# Patient Record
Sex: MALE | Race: WHITE | NOT HISPANIC OR LATINO | Employment: OTHER | URBAN - METROPOLITAN AREA
[De-identification: names, ages, dates, MRNs, and addresses within clinical notes are randomized per-mention and may not be internally consistent; named-entity substitution may affect disease eponyms.]

---

## 2023-04-27 LAB
CHOLESTEROL (EXTERNAL): 115 MG/DL
HDLC SERPL-MCNC: 35 MG/DL
LDL CHOLESTEROL CALCULATED (EXTERNAL): 49 MG/DL
NON HDL CHOLESTEROL (EXTERNAL): 80 MG/DL
TRIGLYCERIDES (EXTERNAL): 185 MG/DL
TSH SERPL-ACNC: 1.7 MIU/L (ref 0.3–4.2)

## 2023-06-05 ENCOUNTER — TRANSFERRED RECORDS (OUTPATIENT)
Dept: MULTI SPECIALTY CLINIC | Facility: CLINIC | Age: 68
End: 2023-06-05

## 2023-06-05 LAB
ALT SERPL-CCNC: 26 U/L (ref 7–55)
AST SERPL-CCNC: 20 U/L (ref 8–48)
CREATININE (EXTERNAL): 0.96 MG/DL (ref 0.74–1.35)
GFR ESTIMATED (EXTERNAL): 86 ML/MIN/BSA
GLUCOSE (EXTERNAL): 105 MG/DL (ref 70–140)
POTASSIUM (EXTERNAL): 4.4 MMOL/L (ref 3.6–5.2)

## 2023-06-12 RX ORDER — ATORVASTATIN CALCIUM 20 MG/1
20 TABLET, FILM COATED ORAL EVERY MORNING
COMMUNITY

## 2023-06-12 RX ORDER — LISINOPRIL 20 MG/1
20 TABLET ORAL DAILY
COMMUNITY

## 2023-06-12 RX ORDER — GABAPENTIN 300 MG/1
600 CAPSULE ORAL 2 TIMES DAILY
COMMUNITY

## 2023-06-14 ENCOUNTER — ANESTHESIA EVENT (OUTPATIENT)
Dept: SURGERY | Facility: CLINIC | Age: 68
DRG: 519 | End: 2023-06-14
Payer: MEDICARE

## 2023-06-15 ENCOUNTER — HOSPITAL ENCOUNTER (INPATIENT)
Facility: CLINIC | Age: 68
LOS: 5 days | Discharge: NURSING, REHAB OR LONG-TERM CARE | DRG: 519 | End: 2023-06-20
Attending: ORTHOPAEDIC SURGERY | Admitting: ORTHOPAEDIC SURGERY
Payer: MEDICARE

## 2023-06-15 ENCOUNTER — APPOINTMENT (OUTPATIENT)
Dept: PHYSICAL THERAPY | Facility: CLINIC | Age: 68
DRG: 519 | End: 2023-06-15
Attending: PHYSICIAN ASSISTANT
Payer: MEDICARE

## 2023-06-15 ENCOUNTER — ANESTHESIA (OUTPATIENT)
Dept: SURGERY | Facility: CLINIC | Age: 68
DRG: 519 | End: 2023-06-15
Payer: MEDICARE

## 2023-06-15 ENCOUNTER — APPOINTMENT (OUTPATIENT)
Dept: RADIOLOGY | Facility: CLINIC | Age: 68
DRG: 519 | End: 2023-06-15
Attending: ORTHOPAEDIC SURGERY
Payer: MEDICARE

## 2023-06-15 DIAGNOSIS — Z98.890 STATUS POST LUMBAR SPINE OPERATIVE PROCEDURE FOR DECOMPRESSION OF SPINAL CORD: ICD-10-CM

## 2023-06-15 DIAGNOSIS — M54.50 LUMBAR BACK PAIN: Primary | ICD-10-CM

## 2023-06-15 PROBLEM — E78.5 HYPERLIPEMIA: Status: ACTIVE | Noted: 2023-06-15

## 2023-06-15 PROBLEM — E78.5 HYPERLIPEMIA: Chronic | Status: ACTIVE | Noted: 2023-06-15

## 2023-06-15 PROBLEM — M43.10 SPONDYLOLISTHESIS: Status: ACTIVE | Noted: 2019-10-30

## 2023-06-15 PROBLEM — M43.16 SPONDYLOLISTHESIS OF LUMBAR REGION: Chronic | Status: ACTIVE | Noted: 2019-10-30

## 2023-06-15 PROBLEM — Z85.71 HISTORY OF HODGKIN'S LYMPHOMA: Status: ACTIVE | Noted: 2017-12-04

## 2023-06-15 PROBLEM — G47.30 SLEEP APNEA: Chronic | Status: ACTIVE | Noted: 2023-06-15

## 2023-06-15 PROBLEM — M43.10 SPONDYLOLISTHESIS: Chronic | Status: ACTIVE | Noted: 2019-10-30

## 2023-06-15 PROBLEM — G47.30 SLEEP APNEA: Status: ACTIVE | Noted: 2023-06-15

## 2023-06-15 PROBLEM — M43.16 SPONDYLOLISTHESIS OF LUMBAR REGION: Status: ACTIVE | Noted: 2019-10-30

## 2023-06-15 PROBLEM — E66.01 MORBID OBESITY (H): Status: ACTIVE | Noted: 2023-06-15

## 2023-06-15 PROBLEM — U07.1 DISEASE DUE TO SEVERE ACUTE RESPIRATORY SYNDROME CORONAVIRUS 2 (SARS-COV-2): Status: ACTIVE | Noted: 2023-02-12

## 2023-06-15 LAB — GLUCOSE BLDC GLUCOMTR-MCNC: 107 MG/DL (ref 70–99)

## 2023-06-15 PROCEDURE — 258N000003 HC RX IP 258 OP 636

## 2023-06-15 PROCEDURE — 250N000009 HC RX 250: Performed by: PHYSICIAN ASSISTANT

## 2023-06-15 PROCEDURE — 258N000003 HC RX IP 258 OP 636: Performed by: NURSE ANESTHETIST, CERTIFIED REGISTERED

## 2023-06-15 PROCEDURE — 999N000141 HC STATISTIC PRE-PROCEDURE NURSING ASSESSMENT: Performed by: ORTHOPAEDIC SURGERY

## 2023-06-15 PROCEDURE — 120N000001 HC R&B MED SURG/OB

## 2023-06-15 PROCEDURE — 710N000010 HC RECOVERY PHASE 1, LEVEL 2, PER MIN: Performed by: ORTHOPAEDIC SURGERY

## 2023-06-15 PROCEDURE — 250N000011 HC RX IP 250 OP 636: Performed by: PHYSICIAN ASSISTANT

## 2023-06-15 PROCEDURE — 258N000003 HC RX IP 258 OP 636: Performed by: ORTHOPAEDIC SURGERY

## 2023-06-15 PROCEDURE — 82962 GLUCOSE BLOOD TEST: CPT

## 2023-06-15 PROCEDURE — 250N000005 HC OR RX SURGIFLO HEMOSTATIC MATRIX 10ML 199102S OPNP: Performed by: ORTHOPAEDIC SURGERY

## 2023-06-15 PROCEDURE — 250N000009 HC RX 250: Performed by: ORTHOPAEDIC SURGERY

## 2023-06-15 PROCEDURE — 272N000001 HC OR GENERAL SUPPLY STERILE: Performed by: ORTHOPAEDIC SURGERY

## 2023-06-15 PROCEDURE — 999N000182 XR SURGERY CARM FLUORO GREATER THAN 5 MIN: Mod: TC

## 2023-06-15 PROCEDURE — 250N000009 HC RX 250: Performed by: NURSE ANESTHETIST, CERTIFIED REGISTERED

## 2023-06-15 PROCEDURE — 01NB0ZZ RELEASE LUMBAR NERVE, OPEN APPROACH: ICD-10-PCS | Performed by: ORTHOPAEDIC SURGERY

## 2023-06-15 PROCEDURE — 258N000003 HC RX IP 258 OP 636: Performed by: PHYSICIAN ASSISTANT

## 2023-06-15 PROCEDURE — 99232 SBSQ HOSP IP/OBS MODERATE 35: CPT | Performed by: INTERNAL MEDICINE

## 2023-06-15 PROCEDURE — 97116 GAIT TRAINING THERAPY: CPT | Mod: GP

## 2023-06-15 PROCEDURE — 5A09357 ASSISTANCE WITH RESPIRATORY VENTILATION, LESS THAN 24 CONSECUTIVE HOURS, CONTINUOUS POSITIVE AIRWAY PRESSURE: ICD-10-PCS | Performed by: ORTHOPAEDIC SURGERY

## 2023-06-15 PROCEDURE — 250N000013 HC RX MED GY IP 250 OP 250 PS 637: Performed by: INTERNAL MEDICINE

## 2023-06-15 PROCEDURE — 97161 PT EVAL LOW COMPLEX 20 MIN: CPT | Mod: GP

## 2023-06-15 PROCEDURE — 250N000011 HC RX IP 250 OP 636: Performed by: ORTHOPAEDIC SURGERY

## 2023-06-15 PROCEDURE — 97530 THERAPEUTIC ACTIVITIES: CPT | Mod: GP

## 2023-06-15 PROCEDURE — 250N000013 HC RX MED GY IP 250 OP 250 PS 637

## 2023-06-15 PROCEDURE — 250N000025 HC SEVOFLURANE, PER MIN: Performed by: ORTHOPAEDIC SURGERY

## 2023-06-15 PROCEDURE — 370N000017 HC ANESTHESIA TECHNICAL FEE, PER MIN: Performed by: ORTHOPAEDIC SURGERY

## 2023-06-15 PROCEDURE — 00NY0ZZ RELEASE LUMBAR SPINAL CORD, OPEN APPROACH: ICD-10-PCS | Performed by: ORTHOPAEDIC SURGERY

## 2023-06-15 PROCEDURE — 250N000011 HC RX IP 250 OP 636: Performed by: NURSE ANESTHETIST, CERTIFIED REGISTERED

## 2023-06-15 PROCEDURE — 360N000084 HC SURGERY LEVEL 4 W/ FLUORO, PER MIN: Performed by: ORTHOPAEDIC SURGERY

## 2023-06-15 PROCEDURE — 250N000013 HC RX MED GY IP 250 OP 250 PS 637: Performed by: PHYSICIAN ASSISTANT

## 2023-06-15 RX ORDER — ONDANSETRON 2 MG/ML
4 INJECTION INTRAMUSCULAR; INTRAVENOUS EVERY 30 MIN PRN
Status: DISCONTINUED | OUTPATIENT
Start: 2023-06-15 | End: 2023-06-15 | Stop reason: HOSPADM

## 2023-06-15 RX ORDER — SENNOSIDES 8.6 MG
1300 CAPSULE ORAL 2 TIMES DAILY
Status: ON HOLD | COMMUNITY
End: 2023-06-16

## 2023-06-15 RX ORDER — HYDROMORPHONE HCL IN WATER/PF 6 MG/30 ML
0.4 PATIENT CONTROLLED ANALGESIA SYRINGE INTRAVENOUS EVERY 5 MIN PRN
Status: DISCONTINUED | OUTPATIENT
Start: 2023-06-15 | End: 2023-06-15 | Stop reason: HOSPADM

## 2023-06-15 RX ORDER — ATORVASTATIN CALCIUM 10 MG/1
20 TABLET, FILM COATED ORAL EVERY MORNING
Status: DISCONTINUED | OUTPATIENT
Start: 2023-06-16 | End: 2023-06-20 | Stop reason: HOSPADM

## 2023-06-15 RX ORDER — OXYCODONE HYDROCHLORIDE 5 MG/1
5 TABLET ORAL EVERY 4 HOURS PRN
Status: DISCONTINUED | OUTPATIENT
Start: 2023-06-15 | End: 2023-06-20 | Stop reason: HOSPADM

## 2023-06-15 RX ORDER — NALOXONE HYDROCHLORIDE 0.4 MG/ML
0.4 INJECTION, SOLUTION INTRAMUSCULAR; INTRAVENOUS; SUBCUTANEOUS
Status: DISCONTINUED | OUTPATIENT
Start: 2023-06-15 | End: 2023-06-20 | Stop reason: HOSPADM

## 2023-06-15 RX ORDER — FENTANYL CITRATE 50 UG/ML
50 INJECTION, SOLUTION INTRAMUSCULAR; INTRAVENOUS EVERY 5 MIN PRN
Status: DISCONTINUED | OUTPATIENT
Start: 2023-06-15 | End: 2023-06-15 | Stop reason: HOSPADM

## 2023-06-15 RX ORDER — CEFAZOLIN SODIUM/WATER 3 G/30 ML
2 SYRINGE (ML) INTRAVENOUS SEE ADMIN INSTRUCTIONS
Status: DISCONTINUED | OUTPATIENT
Start: 2023-06-15 | End: 2023-06-15 | Stop reason: HOSPADM

## 2023-06-15 RX ORDER — HYDROMORPHONE HCL IN WATER/PF 6 MG/30 ML
0.2 PATIENT CONTROLLED ANALGESIA SYRINGE INTRAVENOUS
Status: DISCONTINUED | OUTPATIENT
Start: 2023-06-15 | End: 2023-06-20 | Stop reason: HOSPADM

## 2023-06-15 RX ORDER — FENTANYL CITRATE 50 UG/ML
INJECTION, SOLUTION INTRAMUSCULAR; INTRAVENOUS PRN
Status: DISCONTINUED | OUTPATIENT
Start: 2023-06-15 | End: 2023-06-15

## 2023-06-15 RX ORDER — CEFAZOLIN SODIUM/WATER 3 G/30 ML
2 SYRINGE (ML) INTRAVENOUS
Status: COMPLETED | OUTPATIENT
Start: 2023-06-15 | End: 2023-06-15

## 2023-06-15 RX ORDER — PROPOFOL 10 MG/ML
INJECTION, EMULSION INTRAVENOUS CONTINUOUS PRN
Status: DISCONTINUED | OUTPATIENT
Start: 2023-06-15 | End: 2023-06-15

## 2023-06-15 RX ORDER — NALOXONE HYDROCHLORIDE 0.4 MG/ML
0.2 INJECTION, SOLUTION INTRAMUSCULAR; INTRAVENOUS; SUBCUTANEOUS
Status: DISCONTINUED | OUTPATIENT
Start: 2023-06-15 | End: 2023-06-20 | Stop reason: HOSPADM

## 2023-06-15 RX ORDER — BISACODYL 10 MG
10 SUPPOSITORY, RECTAL RECTAL DAILY PRN
Status: DISCONTINUED | OUTPATIENT
Start: 2023-06-15 | End: 2023-06-20 | Stop reason: HOSPADM

## 2023-06-15 RX ORDER — LABETALOL HYDROCHLORIDE 5 MG/ML
INJECTION, SOLUTION INTRAVENOUS PRN
Status: DISCONTINUED | OUTPATIENT
Start: 2023-06-15 | End: 2023-06-15

## 2023-06-15 RX ORDER — SODIUM CHLORIDE 9 MG/ML
INJECTION, SOLUTION INTRAVENOUS CONTINUOUS
Status: DISCONTINUED | OUTPATIENT
Start: 2023-06-15 | End: 2023-06-20 | Stop reason: HOSPADM

## 2023-06-15 RX ORDER — ACETAMINOPHEN 325 MG/1
975 TABLET ORAL ONCE
Status: COMPLETED | OUTPATIENT
Start: 2023-06-15 | End: 2023-06-15

## 2023-06-15 RX ORDER — FENTANYL CITRATE 50 UG/ML
25 INJECTION, SOLUTION INTRAMUSCULAR; INTRAVENOUS EVERY 5 MIN PRN
Status: DISCONTINUED | OUTPATIENT
Start: 2023-06-15 | End: 2023-06-15 | Stop reason: HOSPADM

## 2023-06-15 RX ORDER — CEFAZOLIN SODIUM 2 G/100ML
2 INJECTION, SOLUTION INTRAVENOUS EVERY 8 HOURS
Status: COMPLETED | OUTPATIENT
Start: 2023-06-15 | End: 2023-06-15

## 2023-06-15 RX ORDER — SODIUM CHLORIDE, SODIUM LACTATE, POTASSIUM CHLORIDE, CALCIUM CHLORIDE 600; 310; 30; 20 MG/100ML; MG/100ML; MG/100ML; MG/100ML
INJECTION, SOLUTION INTRAVENOUS CONTINUOUS
Status: DISCONTINUED | OUTPATIENT
Start: 2023-06-15 | End: 2023-06-15 | Stop reason: HOSPADM

## 2023-06-15 RX ORDER — LISINOPRIL 20 MG/1
20 TABLET ORAL DAILY
Status: DISCONTINUED | OUTPATIENT
Start: 2023-06-15 | End: 2023-06-20 | Stop reason: HOSPADM

## 2023-06-15 RX ORDER — GABAPENTIN 300 MG/1
600 CAPSULE ORAL 2 TIMES DAILY
Status: DISCONTINUED | OUTPATIENT
Start: 2023-06-15 | End: 2023-06-20 | Stop reason: HOSPADM

## 2023-06-15 RX ORDER — OXYCODONE HYDROCHLORIDE 5 MG/1
10 TABLET ORAL EVERY 4 HOURS PRN
Status: DISCONTINUED | OUTPATIENT
Start: 2023-06-15 | End: 2023-06-20 | Stop reason: HOSPADM

## 2023-06-15 RX ORDER — PROCHLORPERAZINE MALEATE 5 MG
5 TABLET ORAL EVERY 6 HOURS PRN
Status: DISCONTINUED | OUTPATIENT
Start: 2023-06-15 | End: 2023-06-20 | Stop reason: HOSPADM

## 2023-06-15 RX ORDER — AMOXICILLIN 250 MG
1 CAPSULE ORAL 2 TIMES DAILY
Status: DISCONTINUED | OUTPATIENT
Start: 2023-06-15 | End: 2023-06-20 | Stop reason: HOSPADM

## 2023-06-15 RX ORDER — MULTIVITAMIN,THERAPEUTIC
1 TABLET ORAL DAILY
Status: DISCONTINUED | OUTPATIENT
Start: 2023-06-16 | End: 2023-06-20 | Stop reason: HOSPADM

## 2023-06-15 RX ORDER — LIDOCAINE 40 MG/G
CREAM TOPICAL
Status: DISCONTINUED | OUTPATIENT
Start: 2023-06-15 | End: 2023-06-15 | Stop reason: HOSPADM

## 2023-06-15 RX ORDER — ONDANSETRON 4 MG/1
4 TABLET, ORALLY DISINTEGRATING ORAL EVERY 30 MIN PRN
Status: DISCONTINUED | OUTPATIENT
Start: 2023-06-15 | End: 2023-06-15 | Stop reason: HOSPADM

## 2023-06-15 RX ORDER — LIDOCAINE HYDROCHLORIDE 10 MG/ML
INJECTION, SOLUTION INFILTRATION; PERINEURAL PRN
Status: DISCONTINUED | OUTPATIENT
Start: 2023-06-15 | End: 2023-06-15

## 2023-06-15 RX ORDER — ONDANSETRON 2 MG/ML
4 INJECTION INTRAMUSCULAR; INTRAVENOUS EVERY 6 HOURS PRN
Status: DISCONTINUED | OUTPATIENT
Start: 2023-06-15 | End: 2023-06-20 | Stop reason: HOSPADM

## 2023-06-15 RX ORDER — HYDROMORPHONE HCL IN WATER/PF 6 MG/30 ML
0.2 PATIENT CONTROLLED ANALGESIA SYRINGE INTRAVENOUS EVERY 5 MIN PRN
Status: DISCONTINUED | OUTPATIENT
Start: 2023-06-15 | End: 2023-06-15 | Stop reason: HOSPADM

## 2023-06-15 RX ORDER — ONDANSETRON 4 MG/1
4 TABLET, ORALLY DISINTEGRATING ORAL EVERY 6 HOURS PRN
Status: DISCONTINUED | OUTPATIENT
Start: 2023-06-15 | End: 2023-06-20 | Stop reason: HOSPADM

## 2023-06-15 RX ORDER — DEXAMETHASONE SODIUM PHOSPHATE 10 MG/ML
INJECTION, SOLUTION INTRAMUSCULAR; INTRAVENOUS PRN
Status: DISCONTINUED | OUTPATIENT
Start: 2023-06-15 | End: 2023-06-15

## 2023-06-15 RX ORDER — GABAPENTIN 300 MG/1
600 CAPSULE ORAL 2 TIMES DAILY
Status: DISCONTINUED | OUTPATIENT
Start: 2023-06-15 | End: 2023-06-15

## 2023-06-15 RX ORDER — GABAPENTIN 100 MG/1
100 CAPSULE ORAL
Status: COMPLETED | OUTPATIENT
Start: 2023-06-15 | End: 2023-06-15

## 2023-06-15 RX ORDER — ONDANSETRON 2 MG/ML
INJECTION INTRAMUSCULAR; INTRAVENOUS PRN
Status: DISCONTINUED | OUTPATIENT
Start: 2023-06-15 | End: 2023-06-15

## 2023-06-15 RX ORDER — PROPOFOL 10 MG/ML
INJECTION, EMULSION INTRAVENOUS PRN
Status: DISCONTINUED | OUTPATIENT
Start: 2023-06-15 | End: 2023-06-15

## 2023-06-15 RX ORDER — HYDROMORPHONE HCL IN WATER/PF 6 MG/30 ML
0.4 PATIENT CONTROLLED ANALGESIA SYRINGE INTRAVENOUS
Status: DISCONTINUED | OUTPATIENT
Start: 2023-06-15 | End: 2023-06-20 | Stop reason: HOSPADM

## 2023-06-15 RX ORDER — POLYETHYLENE GLYCOL 3350 17 G/17G
17 POWDER, FOR SOLUTION ORAL DAILY
Status: DISCONTINUED | OUTPATIENT
Start: 2023-06-16 | End: 2023-06-20 | Stop reason: HOSPADM

## 2023-06-15 RX ORDER — LORATADINE 10 MG/1
10 TABLET ORAL DAILY PRN
Status: DISCONTINUED | OUTPATIENT
Start: 2023-06-15 | End: 2023-06-20 | Stop reason: HOSPADM

## 2023-06-15 RX ORDER — SWAB
1 SWAB, NON-MEDICATED MISCELLANEOUS DAILY
COMMUNITY

## 2023-06-15 RX ADMIN — ACETAMINOPHEN 975 MG: 325 TABLET ORAL at 06:36

## 2023-06-15 RX ADMIN — PHENYLEPHRINE HYDROCHLORIDE 0.1 MCG/KG/MIN: 10 INJECTION INTRAVENOUS at 08:41

## 2023-06-15 RX ADMIN — TRANEXAMIC ACID 1000 MG: 1 INJECTION, SOLUTION INTRAVENOUS at 07:45

## 2023-06-15 RX ADMIN — LIDOCAINE HYDROCHLORIDE 20 MG: 10 INJECTION, SOLUTION INFILTRATION; PERINEURAL at 07:27

## 2023-06-15 RX ADMIN — ROCURONIUM BROMIDE 20 MG: 10 INJECTION, SOLUTION INTRAVENOUS at 08:45

## 2023-06-15 RX ADMIN — ROCURONIUM BROMIDE 40 MG: 10 INJECTION, SOLUTION INTRAVENOUS at 07:59

## 2023-06-15 RX ADMIN — HYDROMORPHONE HYDROCHLORIDE 0.5 MG: 1 INJECTION, SOLUTION INTRAMUSCULAR; INTRAVENOUS; SUBCUTANEOUS at 10:15

## 2023-06-15 RX ADMIN — HYDROMORPHONE HYDROCHLORIDE 0.4 MG: 0.2 INJECTION, SOLUTION INTRAMUSCULAR; INTRAVENOUS; SUBCUTANEOUS at 19:41

## 2023-06-15 RX ADMIN — HYDROMORPHONE HYDROCHLORIDE 0.5 MG: 1 INJECTION, SOLUTION INTRAMUSCULAR; INTRAVENOUS; SUBCUTANEOUS at 09:37

## 2023-06-15 RX ADMIN — FENTANYL CITRATE 50 MCG: 50 INJECTION, SOLUTION INTRAMUSCULAR; INTRAVENOUS at 07:45

## 2023-06-15 RX ADMIN — ONDANSETRON 4 MG: 2 INJECTION INTRAMUSCULAR; INTRAVENOUS at 10:15

## 2023-06-15 RX ADMIN — LABETALOL HYDROCHLORIDE 5 MG: 5 INJECTION, SOLUTION INTRAVENOUS at 10:30

## 2023-06-15 RX ADMIN — LISINOPRIL 20 MG: 20 TABLET ORAL at 14:32

## 2023-06-15 RX ADMIN — CEFAZOLIN SODIUM 2 G: 2 INJECTION, SOLUTION INTRAVENOUS at 14:32

## 2023-06-15 RX ADMIN — HYDROMORPHONE HYDROCHLORIDE 0.5 MG: 1 INJECTION, SOLUTION INTRAMUSCULAR; INTRAVENOUS; SUBCUTANEOUS at 10:50

## 2023-06-15 RX ADMIN — HYDROMORPHONE HYDROCHLORIDE 0.5 MG: 1 INJECTION, SOLUTION INTRAMUSCULAR; INTRAVENOUS; SUBCUTANEOUS at 10:32

## 2023-06-15 RX ADMIN — GABAPENTIN 600 MG: 300 CAPSULE ORAL at 20:19

## 2023-06-15 RX ADMIN — SODIUM CHLORIDE, POTASSIUM CHLORIDE, SODIUM LACTATE AND CALCIUM CHLORIDE: 600; 310; 30; 20 INJECTION, SOLUTION INTRAVENOUS at 06:52

## 2023-06-15 RX ADMIN — FENTANYL CITRATE 50 MCG: 50 INJECTION, SOLUTION INTRAMUSCULAR; INTRAVENOUS at 07:27

## 2023-06-15 RX ADMIN — Medication 3 G: at 07:30

## 2023-06-15 RX ADMIN — DEXAMETHASONE SODIUM PHOSPHATE 10 MG: 10 INJECTION, SOLUTION INTRAMUSCULAR; INTRAVENOUS at 07:27

## 2023-06-15 RX ADMIN — MIDAZOLAM 2 MG: 1 INJECTION INTRAMUSCULAR; INTRAVENOUS at 07:22

## 2023-06-15 RX ADMIN — PHENYLEPHRINE HYDROCHLORIDE 50 MCG: 10 INJECTION INTRAVENOUS at 08:41

## 2023-06-15 RX ADMIN — PHENYLEPHRINE HYDROCHLORIDE 50 MCG: 10 INJECTION INTRAVENOUS at 08:27

## 2023-06-15 RX ADMIN — GABAPENTIN 100 MG: 100 CAPSULE ORAL at 06:36

## 2023-06-15 RX ADMIN — PROPOFOL 150 MG: 10 INJECTION, EMULSION INTRAVENOUS at 07:27

## 2023-06-15 RX ADMIN — ROCURONIUM BROMIDE 30 MG: 10 INJECTION, SOLUTION INTRAVENOUS at 09:15

## 2023-06-15 RX ADMIN — ROCURONIUM BROMIDE 50 MG: 10 INJECTION, SOLUTION INTRAVENOUS at 08:15

## 2023-06-15 RX ADMIN — ROCURONIUM BROMIDE 60 MG: 10 INJECTION, SOLUTION INTRAVENOUS at 07:27

## 2023-06-15 RX ADMIN — PHENYLEPHRINE HYDROCHLORIDE 100 MCG: 10 INJECTION INTRAVENOUS at 08:00

## 2023-06-15 RX ADMIN — SENNOSIDES AND DOCUSATE SODIUM 1 TABLET: 50; 8.6 TABLET ORAL at 20:19

## 2023-06-15 RX ADMIN — SODIUM CHLORIDE, POTASSIUM CHLORIDE, SODIUM LACTATE AND CALCIUM CHLORIDE: 600; 310; 30; 20 INJECTION, SOLUTION INTRAVENOUS at 08:30

## 2023-06-15 RX ADMIN — CEFAZOLIN SODIUM 2 G: 2 INJECTION, SOLUTION INTRAVENOUS at 22:10

## 2023-06-15 RX ADMIN — OXYCODONE HYDROCHLORIDE 5 MG: 5 TABLET ORAL at 16:21

## 2023-06-15 RX ADMIN — SODIUM CHLORIDE: 9 INJECTION, SOLUTION INTRAVENOUS at 13:20

## 2023-06-15 RX ADMIN — PROPOFOL 50 MCG/KG/MIN: 10 INJECTION, EMULSION INTRAVENOUS at 07:40

## 2023-06-15 ASSESSMENT — ACTIVITIES OF DAILY LIVING (ADL)
ADLS_ACUITY_SCORE: 24
ADLS_ACUITY_SCORE: 23
ADLS_ACUITY_SCORE: 24
ADLS_ACUITY_SCORE: 24
ADLS_ACUITY_SCORE: 23
ADLS_ACUITY_SCORE: 22
ADLS_ACUITY_SCORE: 22
ADLS_ACUITY_SCORE: 24
ADLS_ACUITY_SCORE: 23

## 2023-06-15 NOTE — PROGRESS NOTES
Care Management Follow Up    Length of Stay (days): 0    Expected Discharge Date: 06/17/2023     Concerns to be Addressed:  Discharge planning       Additional Information:  Chart review, patient POD #0, PT/OT evals pending.  Anticipate discharge home with friends/family transporting.    CM/SW available if needs arise.      Paulina Broussard CM

## 2023-06-15 NOTE — PHARMACY-ADMISSION MEDICATION HISTORY
Pharmacist completed medication history with the patient while in the [facility].  Prior to admission (PTA) med list completed and updated in the electronic medical record (EMR).  Pharmacy Note - Admission Medication History  Pertinent Provider Information: none   ______________________________________________________________________  Prior To Admission (PTA) med list completed and updated in EMR.     Medications Prior to Admission   Medication Sig Dispense Refill Last Dose     acetaminophen (TYLENOL 8 HOUR) 650 MG CR tablet Take 1,300 mg by mouth 2 times daily   6/15/2023 at am     atorvastatin (LIPITOR) 20 MG tablet Take 20 mg by mouth every morning   6/15/2023     gabapentin (NEURONTIN) 300 MG capsule Take 600 mg by mouth 2 times daily   6/15/2023 at 0500     lisinopril (ZESTRIL) 20 MG tablet Take 20 mg by mouth daily   6/14/2023     vitamin D3 (CHOLECALCIFEROL) 10 MCG (400 UNIT) capsule Take 1 capsule by mouth daily   6/12/2023         Information source(s): Patient and CareEverywhere/SureScripts  Patient was asked about OTC/herbal products specifically.  PTA med list reflects this.  Based on the pharmacist s assessment, the PTA med list information appears reliable  Allergies were reviewed, assessed, and updated with the patient.    Medications available for use during hospital stay: NONE  Thank you for the opportunity to participate in the care of this patient.    The patient was asked about OTC/herbal products specifically and the PTA med list reflects the patient's response.    Allergies were reviewed and assessed with the patient, responses were updated in the EMR.    Thank you for the opportunity to participate in the care of this patient.    Jose English RPH 6/15/2023 7:11 AM

## 2023-06-15 NOTE — ANESTHESIA PREPROCEDURE EVALUATION
Anesthesia Pre-Procedure Evaluation    Patient: Desmond Valenzuela   MRN: [id number] : 1955        Procedure : Procedure(s):  BILATERAL LUMBAR 2-3, LUMBAR 3-4 AND LUMBAR 4-5 LAMINECTOMY          Past Medical History:   Diagnosis Date     Cancer (H)      Hypertension      Sleep apnea       Past Surgical History:   Procedure Laterality Date     EYE SURGERY Left      HC EXCIS PRIMARY GANGLION WRIST  2005    RT      No Known Allergies   Social History     Tobacco Use     Smoking status: Former     Years: 10.00     Types: Cigarettes     Quit date:      Years since quittin.4     Smokeless tobacco: Never   Vaping Use     Vaping status: Never Used   Substance Use Topics     Alcohol use: Not Currently      Wt Readings from Last 1 Encounters:   06/15/23 132.2 kg (291 lb 8 oz)        Anesthesia Evaluation   Pt has had prior anesthetic. Type: General.    No history of anesthetic complications       ROS/MED HX  ENT/Pulmonary:  - neg pulmonary ROS   (+) sleep apnea, moderate, uses CPAP,     Neurologic:  - neg neurologic ROS     Cardiovascular:  - neg cardiovascular ROS   (+) hypertension-----    METS/Exercise Tolerance: >4 METS    Hematologic:  - neg hematologic  ROS     Musculoskeletal:  - neg musculoskeletal ROS     GI/Hepatic:  - neg GI/hepatic ROS     Renal/Genitourinary:  - neg Renal ROS     Endo:  - neg endo ROS   (+) Obesity,     Psychiatric/Substance Use:  - neg psychiatric ROS     Infectious Disease:  - neg infectious disease ROS     Malignancy:  - neg malignancy ROS     Other:  - neg other ROS          Physical Exam    Airway  airway exam normal      Mallampati: II   TM distance: > 3 FB   Neck ROM: full     Respiratory Devices and Support         Dental       (+) Completely normal teeth      Cardiovascular   cardiovascular exam normal          Pulmonary   pulmonary exam normal                OUTSIDE LABS:  CBC: No results found for: WBC, HGB, HCT, PLT  BMP: No results found for: NA, POTASSIUM,  CHLORIDE, CO2, BUN, CR, GLC  COAGS: No results found for: PTT, INR, FIBR  POC: No results found for: BGM, HCG, HCGS  HEPATIC: No results found for: ALBUMIN, PROTTOTAL, ALT, AST, GGT, ALKPHOS, BILITOTAL, BILIDIRECT, ZACKARY  OTHER: No results found for: PH, LACT, A1C, DEJUAN, PHOS, MAG, LIPASE, AMYLASE, TSH, T4, T3, CRP, SED    Anesthesia Plan    ASA Status:  3      Anesthesia Type: General.     - Airway: ETT   Induction: Intravenous.   Maintenance: Balanced.        Consents    Anesthesia Plan(s) and associated risks, benefits, and realistic alternatives discussed. Questions answered and patient/representative(s) expressed understanding.    - Discussed:     - Discussed with:  Patient      - Extended Intubation/Ventilatory Support Discussed: No.      - Patient is DNR/DNI Status: No    Use of blood products discussed: No .     Postoperative Care    Pain management: Multi-modal analgesia.   PONV prophylaxis: Ondansetron (or other 5HT-3), Dexamethasone or Solumedrol     Comments:                Laith Ortiz MD

## 2023-06-15 NOTE — ANESTHESIA PROCEDURE NOTES
Airway       Patient location during procedure: OR       Procedure Start/Stop Times: 6/15/2023 7:29 AM and 6/15/2023 7:31 AM  Staff -        CRNA: Lion Morales APRN CRNA       Performed By: CRNA  Consent for Airway        Urgency: elective  Indications and Patient Condition       Indications for airway management: arline-procedural       Induction type:intravenous       Mask difficulty assessment: 1 - vent by mask    Final Airway Details       Final airway type: endotracheal airway       Successful airway: ETT - single  Endotracheal Airway Details        ETT size (mm): 7.5       Cuffed: yes       Successful intubation technique: direct laryngoscopy       DL Blade Type: [place] 2       Grade View of Cords: 1       Adjucts: stylet       Position: Right       Measured from: lips       Secured at (cm): 24       Bite block used: Soft    Post intubation assessment        Placement verified by: capnometry        Number of attempts at approach: 1       Secured with: silk tape       Ease of procedure: easy       Dentition: Intact and Unchanged       Dental guard used and removed. Dental Guard Type: Proguard Red.    Medication(s) Administered   Medication Administration Time: 6/15/2023 7:29 AM

## 2023-06-15 NOTE — ANESTHESIA POSTPROCEDURE EVALUATION
Patient: Desmond Valenzuela    Procedure: Procedure(s):  BILATERAL LUMBAR 2-3, LUMBAR 3-4 AND LUMBAR 4-5 LAMINECTOMY       Anesthesia Type:  General    Note:  Disposition: Outpatient   Postop Pain Control: Uneventful            Sign Out: Well controlled pain   PONV: No   Neuro/Psych: Uneventful            Sign Out: Acceptable/Baseline neuro status   Airway/Respiratory: Uneventful            Sign Out: Acceptable/Baseline resp. status   CV/Hemodynamics: Uneventful            Sign Out: Acceptable CV status; No obvious hypovolemia; No obvious fluid overload   Other NRE: NONE   DID A NON-ROUTINE EVENT OCCUR? No           Last vitals:  Vitals Value Taken Time   /76 06/15/23 1200   Temp 36.5  C (97.7  F) 06/15/23 1050   Pulse 75 06/15/23 1216   Resp 14 06/15/23 1216   SpO2 96 % 06/15/23 1216   Vitals shown include unvalidated device data.    Electronically Signed By: Laith Ortiz MD  Jacqueline 15, 2023  12:17 PM

## 2023-06-15 NOTE — ANESTHESIA CARE TRANSFER NOTE
Patient: Desmond Valenzuela    Procedure: Procedure(s):  BILATERAL LUMBAR 2-3, LUMBAR 3-4 AND LUMBAR 4-5 LAMINECTOMY       Diagnosis: Spinal stenosis, lumbar [M48.061]  Diagnosis Additional Information: No value filed.    Anesthesia Type:   General     Note:    Oropharynx: oropharynx clear of all foreign objects and spontaneously breathing  Level of Consciousness: awake  Oxygen Supplementation: face mask  Level of Supplemental Oxygen (L/min / FiO2): 8  Independent Airway: airway patency satisfactory and stable  Dentition: dentition unchanged  Vital Signs Stable: post-procedure vital signs reviewed and stable  Report to RN Given: handoff report given  Patient transferred to: PACU  Comments: Sao2 94%  Handoff Report: Identifed the Patient, Identified the Reponsible Provider, Reviewed the pertinent medical history, Discussed the surgical course, Reviewed Intra-OP anesthesia mangement and issues during anesthesia, Set expectations for post-procedure period and Allowed opportunity for questions and acknowledgement of understanding      Vitals:  Vitals Value Taken Time   /68 06/15/23 1051   Temp     Pulse 72 06/15/23 1050   Resp     SpO2 89 % 06/15/23 1050   Vitals shown include unvalidated device data.    Electronically Signed By: MACK Wagoner CRNA  Jacqueline 15, 2023  10:52 AM

## 2023-06-15 NOTE — PROGRESS NOTES
"[facility]    Medicine Progress Note - Hospitalist Service    Date of Admission:  6/15/2023    Assessment & Plan   Principal Problem:    Lumbar back pain (6/15/2023)  Active Problems:    Spondylolisthesis of lumbar region (10/30/2019)    Hx of lumbosacral spine surgery (6/15/2023)    History of Hodgkin's lymphoma (12/4/2017)    Hyperlipemia (6/15/2023)    Morbid obesity (H) (6/15/2023)    Benign essential hypertension (11/4/2011)    Sleep apnea (6/15/2023)    Status post lumbar surgery earlier today he is satisfactory postop history reviewed updated sleep apnea with device at home hypertension hyperlipidemia remote history of Hodgkin's lymphoma plan per spine surgery       Diet: Advance Diet as Tolerated: Clear Liquid Diet    DVT Prophylaxis: Pneumatic Compression Devices  Marie Catheter: PRESENT, indication: Deep Sedation/Paralysis  Lines: None     Cardiac Monitoring: None  Code Status: Full Code      Clinically Significant Risk Factors Present on Admission                  # Hypertension: Noted on problem list      # Severe Obesity: Estimated body mass index is 41.83 kg/m  as calculated from the following:    Height as of this encounter: 1.778 m (5' 10\").    Weight as of this encounter: 132.2 kg (291 lb 8 oz).            Disposition Plan      Expected Discharge Date: 06/17/2023      Destination: home with family            Jluis Eugene MD  Hospitalist Service  [facility]  Securely message with [employer] (more info)  Text page via [employer] Paging/Directory   ______________________________________________________________________    Interval History   He is seen early postop no major issues or concerns pain is controlled he has oxygen supplementation on history of sleep apnea    Physical Exam   Vital Signs: Temp: 98  F (36.7  C) Temp src: Oral BP: 120/68 Pulse: 81   Resp: 18 SpO2: 92 % O2 Device: None (Room air) Oxygen Delivery: 1 LPM  Weight: 291 lbs 8 oz    Oxygen " mask on lungs are clear heart regular rhythm dressing intact over lumbar area extremities negative speech is fluent    Medical Decision Making       Time 35 minutes  Data   Recent Results (from the past 24 hour(s))   Glucose by meter    Collection Time: 06/15/23  6:49 AM   Result Value Ref Range    GLUCOSE BY METER POCT 107 (H) 70 - 99 mg/dL

## 2023-06-15 NOTE — OP NOTE
DATE OF SERVICE: 06/15/2023     PREOPERATIVE DIAGNOSES:   1. Multilevel degenerative disk disease lumbar spine.   2. Severe spinal stenosis L2-3, L3-4, and L4-5 with bilateral facet joint hypertrophy and neurogenic claudication.   3. Failure of extensive conservative measures.      POSTOPERATIVE DIAGNOSES:   1. Multilevel degenerative disk disease lumbar spine.   2. Severe spinal stenosis L2-3, L3-4, and L4-5 with bilateral facet joint hypertrophy and neurogenic claudication.   3. Failure of extensive conservative measures.      PROCEDURE: Laminectomy, bilateral partial medial facetectomy and foraminotomy L2-3, L3-4, and L4-5 with complete decompression of the thecal sac the exiting and traversing nerve roots.     SURGEON: Lopez Guerra MD     ASSISTANT:  Jillian Munoz PA-C who was needed for patient safety, soft tissue retraction, patient safety and assistance with closure of the wound.  She was vital in the protection of the nerve roots and dura.      ESTIMATED BLOOD LOSS: 300 mL       DRAINS: One deep Hemovac due to the 3 level nature of the decompression.     COMPLICATIONS: None perceived.      SPECIMENS SENT: None.      HISTORY OF PRESENT ILLNESS AND INDICATIONS FOR PROCEDURE: This is a 67 year old patient who came to my clinic with classic description of neurogenic claudication, pain in the buttocks and legs with standing and walking. He was diagnosed with spinal stenosis.  We discussed the situation the risks, benefits, options and alternatives and the patient understood extremely well that the back pain would not be improved by the surgery and that the buttock and leg pain while standing and walking was the primary reason for surgery. There was absolutely no ambiguity either to the patient or the family.      Therefore, the patient was brought to the operating room after the consent was verified and placed on the Tung table with a [place] frame with care taken to pad all bony prominences. Pause for the  Cause was performed correctly identifying the patient, procedure, proposed plan and radiographs and all were in agreement. This was done after the patient was prepped and draped in standard fashion for a lumbar spine procedure. We then localized our incision so as not to cause any iatrogenic tissue damage and dissected down over the L2, the L3, and the L4 hemilamina bilaterally. We again verified our level with lateral fluoroscopy and then began our laminectomy, bilateral partial medial facetectomy and foraminotomy at the L4-5 level, performing the same procedure.  We traveled up to the L3-4 level, performing the same procedure and at the L2-3 level. When we were finished, we performed a laminectomy, bilateral partial medial facetectomy and foraminotomy at L2-3 and L3-4, L4-5, and L5-S1. We were able to decompress this fully and went all the way up to the end of the ligamentum flavum at L2. There was absolutely no continued neural compression. There was some epidural bleeding given the patient's severe stenosis. We were able to control it nicely with bipolar electrocautery and surgiflo and cottonoids.      When we were finished, there was no significant bleeding. Hemovac drain was placed.  We irrigated the wound copiously, closed the deep fascia with #1 Vicryl, subcutaneous tissue with 2-0 Vicryl, skin with a 3-0 monocryl and surgical glue.  Sterile dressing was applied.        The patient tolerated procedure well. There were no apparent complications. Pt will be weightbearing as tolerated bilateral lower extremities. Strict instructions to avoid bending, lifting and twisting over the next 6 weeks. No more than 15 pounds lifting. The patient will have early mobilization and RODO stockings for DVT prophylaxis and 2 grams of Ancef IV q.8 hours x2 doses for antibiotics. Return to see me in 2 weeks, sooner if there are any problems, questions or concerns.

## 2023-06-15 NOTE — INTERVAL H&P NOTE
"I have reviewed the surgical (or preoperative) H&P that is linked to this encounter, and examined the patient. There are no significant changes    Clinical Conditions Present on Arrival:  Clinically Significant Risk Factors Present on Admission                  # Severe Obesity: Estimated body mass index is 41.83 kg/m  as calculated from the following:    Height as of this encounter: 1.778 m (5' 10\").    Weight as of this encounter: 132.2 kg (291 lb 8 oz).       "

## 2023-06-15 NOTE — PROGRESS NOTES
S: Pt. seen in the [facility]. Male. Alexander HERNANDEZ. RX: Assess for brace. DX: L2-3, L3-4, L4-5 Laminectomy.  O:A: Today I F/D an Aspen Quikdraw RAP LSO to support surgical site. Donning doffing wear and care instructions given verbally and hard copy.  P: Pt. to be seen as needed.    Eran STEINER,LO

## 2023-06-15 NOTE — PROGRESS NOTES
Pt admitted at 1310, s/p L2-L5 laminectomy. IVF at 125/hr. Pt quite drowsy and remained on supplemental oxygen until more awake. Marie not yet removed. PT fitted pt for brace. Pt is tolerable; No intervention needed since arrival to floor.

## 2023-06-16 ENCOUNTER — APPOINTMENT (OUTPATIENT)
Dept: PHYSICAL THERAPY | Facility: CLINIC | Age: 68
DRG: 519 | End: 2023-06-16
Attending: ORTHOPAEDIC SURGERY
Payer: MEDICARE

## 2023-06-16 ENCOUNTER — APPOINTMENT (OUTPATIENT)
Dept: OCCUPATIONAL THERAPY | Facility: CLINIC | Age: 68
DRG: 519 | End: 2023-06-16
Attending: ORTHOPAEDIC SURGERY
Payer: MEDICARE

## 2023-06-16 LAB
CREAT SERPL-MCNC: 0.89 MG/DL (ref 0.7–1.3)
GFR SERPL CREATININE-BSD FRML MDRD: >90 ML/MIN/1.73M2
HGB BLD-MCNC: 11.7 G/DL (ref 13.3–17.7)

## 2023-06-16 PROCEDURE — 36415 COLL VENOUS BLD VENIPUNCTURE: CPT | Performed by: ORTHOPAEDIC SURGERY

## 2023-06-16 PROCEDURE — 97110 THERAPEUTIC EXERCISES: CPT | Mod: GP

## 2023-06-16 PROCEDURE — 250N000011 HC RX IP 250 OP 636: Performed by: PHYSICIAN ASSISTANT

## 2023-06-16 PROCEDURE — 97116 GAIT TRAINING THERAPY: CPT | Mod: GP

## 2023-06-16 PROCEDURE — 85018 HEMOGLOBIN: CPT

## 2023-06-16 PROCEDURE — 97535 SELF CARE MNGMENT TRAINING: CPT | Mod: GO

## 2023-06-16 PROCEDURE — 250N000011 HC RX IP 250 OP 636

## 2023-06-16 PROCEDURE — 97166 OT EVAL MOD COMPLEX 45 MIN: CPT | Mod: GO

## 2023-06-16 PROCEDURE — 82565 ASSAY OF CREATININE: CPT | Performed by: ORTHOPAEDIC SURGERY

## 2023-06-16 PROCEDURE — 99231 SBSQ HOSP IP/OBS SF/LOW 25: CPT | Performed by: INTERNAL MEDICINE

## 2023-06-16 PROCEDURE — 120N000001 HC R&B MED SURG/OB

## 2023-06-16 PROCEDURE — 250N000013 HC RX MED GY IP 250 OP 250 PS 637: Performed by: PHYSICIAN ASSISTANT

## 2023-06-16 PROCEDURE — 250N000013 HC RX MED GY IP 250 OP 250 PS 637: Performed by: INTERNAL MEDICINE

## 2023-06-16 RX ORDER — AMOXICILLIN 250 MG
1 CAPSULE ORAL 2 TIMES DAILY
Qty: 60 TABLET | Refills: 0 | Status: SHIPPED | OUTPATIENT
Start: 2023-06-16

## 2023-06-16 RX ORDER — OXYCODONE HYDROCHLORIDE 5 MG/1
5 TABLET ORAL EVERY 4 HOURS PRN
Qty: 32 TABLET | Refills: 0 | Status: SHIPPED | OUTPATIENT
Start: 2023-06-16 | End: 2023-06-18

## 2023-06-16 RX ORDER — CEFAZOLIN SODIUM 2 G/100ML
2 INJECTION, SOLUTION INTRAVENOUS EVERY 8 HOURS
Status: DISCONTINUED | OUTPATIENT
Start: 2023-06-16 | End: 2023-06-18

## 2023-06-16 RX ADMIN — OXYCODONE HYDROCHLORIDE 5 MG: 5 TABLET ORAL at 01:00

## 2023-06-16 RX ADMIN — CHOLECALCIFEROL TAB 10 MCG (400 UNIT) 400 UNITS: 10 TAB at 09:26

## 2023-06-16 RX ADMIN — LISINOPRIL 20 MG: 20 TABLET ORAL at 09:22

## 2023-06-16 RX ADMIN — CEFAZOLIN SODIUM 2 G: 2 INJECTION, SOLUTION INTRAVENOUS at 09:27

## 2023-06-16 RX ADMIN — OXYCODONE HYDROCHLORIDE 5 MG: 5 TABLET ORAL at 10:47

## 2023-06-16 RX ADMIN — HYDROMORPHONE HYDROCHLORIDE 0.4 MG: 0.2 INJECTION, SOLUTION INTRAMUSCULAR; INTRAVENOUS; SUBCUTANEOUS at 03:44

## 2023-06-16 RX ADMIN — SENNOSIDES AND DOCUSATE SODIUM 1 TABLET: 50; 8.6 TABLET ORAL at 09:22

## 2023-06-16 RX ADMIN — GABAPENTIN 600 MG: 300 CAPSULE ORAL at 09:22

## 2023-06-16 RX ADMIN — SENNOSIDES AND DOCUSATE SODIUM 1 TABLET: 50; 8.6 TABLET ORAL at 20:37

## 2023-06-16 RX ADMIN — GABAPENTIN 600 MG: 300 CAPSULE ORAL at 20:36

## 2023-06-16 RX ADMIN — OXYCODONE HYDROCHLORIDE 10 MG: 5 TABLET ORAL at 14:53

## 2023-06-16 RX ADMIN — THERA TABS 1 TABLET: TAB at 09:22

## 2023-06-16 RX ADMIN — CEFAZOLIN SODIUM 2 G: 2 INJECTION, SOLUTION INTRAVENOUS at 16:14

## 2023-06-16 RX ADMIN — OXYCODONE HYDROCHLORIDE 5 MG: 5 TABLET ORAL at 19:12

## 2023-06-16 RX ADMIN — ATORVASTATIN CALCIUM 20 MG: 10 TABLET, FILM COATED ORAL at 09:22

## 2023-06-16 ASSESSMENT — ACTIVITIES OF DAILY LIVING (ADL)
ADLS_ACUITY_SCORE: 23
IADL_COMMENTS: FAMILY WILL DO UNTIL PT IS RECOVERED

## 2023-06-16 NOTE — PROGRESS NOTES
06/15/23 1430   Appointment Info   Signing Clinician's Name / Credentials (PT) Joi Unger, PT, DPT   Living Environment   People in Home alone   Current Living Arrangements house   Home Accessibility stairs to enter home   Number of Stairs, Main Entrance 3   Stair Railings, Main Entrance railing on left side (ascending)   Self-Care   Usual Activity Tolerance good   Current Activity Tolerance good   Equipment Currently Used at Home none   Fall history within last six months no   General Information   Onset of Illness/Injury or Date of Surgery 06/15/23   Referring Physician Lopez Guerra MD   Patient/Family Therapy Goals Statement (PT) walk more, get stronger   Pertinent History of Current Problem (include personal factors and/or comorbidities that impact the POC) s/p L2-5 Lami   Existing Precautions/Restrictions spinal   Weight-Bearing Status - LLE weight-bearing as tolerated   Weight-Bearing Status - RLE weight-bearing as tolerated   Range of Motion (ROM)   ROM Comment WFL   Strength (Manual Muscle Testing)   Strength Comments WFL   Bed Mobility   Bed Mobility supine-sit   Supine-Sit [place] (Bed Mobility) minimum assist (75% patient effort);verbal cues   Transfers   Transfers sit-stand transfer   Sit-Stand Transfer   Sit-Stand [place] (Transfers) contact guard;verbal cues   Assistive Device (Sit-Stand Transfers) walker, front-wheeled   Gait/Stairs (Locomotion)   [place] Level (Gait) verbal cues;contact guard   Assistive Device (Gait) walker, front-wheeled   Distance in Feet 15'   Distance in Feet (Gait) 330'   Pattern (Gait) step-to;step-through   Clinical Impression   Criteria for Skilled Therapeutic Intervention Yes, treatment indicated   PT Diagnosis (PT) impaired functional mobility   Influenced by the following impairments weakness, pain   Functional limitations due to impairments stairs, gait, transfers   Clinical Presentation (PT Evaluation Complexity) Stable/Uncomplicated    Clinical Presentation Rationale pt presents as medically diagnosed   Clinical Decision Making (Complexity) moderate complexity   Planned Therapy Interventions (PT) balance training;bed mobility training;gait training;home exercise program;patient/family education;ROM (range of motion);stair training;strengthening;transfer training   Anticipated Equipment Needs at Discharge (PT) walker, rolling   Risk & Benefits of therapy have been explained care plan/treatment goals reviewed;patient   PT Total Evaluation Time   PT Eval, Low Complexity Minutes (33777) 10   Plan of Care Review   Plan of Care Reviewed With patient   Physical Therapy Goals   PT Frequency 2x/day   PT Predicted Duration/Target Date for Goal Attainment 06/17/23   PT Goals Transfers;Gait;Stairs;PT Goal 1   PT: Transfers Modified independent;Sit to/from stand;Assistive device   PT: Gait Modified independent;Rolling walker;100 feet   PT: Stairs Supervision/stand-by assist;3 stairs;Rail on left   PT: Goal 1 independent with written HEP for LE strengthening   Interventions   Interventions Quick Adds Gait Training;Therapeutic Activity;Therapeutic Procedure   Therapeutic Activity   Therapeutic Activities: dynamic activities to improve functional performance Minutes (90483) 10   Symptoms Noted During/After Treatment None   Treatment Detail/Skilled Intervention supine to sit with log roll technique, min A for rolling onto L side, educated on precautions, SBA for sidelying to sitting, sitting EOB x3 min, educated on donning brace, cueing for posture and PLB, sit to/from stand, CGA   Gait Training   Gait Training Minutes (72953) 15   Symptoms Noted During/After Treatment (Gait Training) none   Treatment Detail/Skilled Intervention cueing for posture and PLB, educated on precautions and using FWW   [place] Level (Gait Training) contact guard   Physical Assistance Level (Gait Training) verbal cues;supervision   Weight Bearing (Gait Training) weight-bearing as  tolerated   Assistive Device (Gait Training) rolling walker   Gait Analysis Deviations decreased cyrus;decreased step length;decreased stride length   PT Discharge Planning   PT Plan gait, stairs, transfers   PT Discharge Recommendation (DC Rec) (S)  home with assist   PT Rationale for DC Rec home with assist from family as needed   PT Brief overview of current status ambulating 330' with FWW, CGA   Total Session Time   Timed Code Treatment Minutes 25   Total Session Time (sum of timed and untimed services) 35

## 2023-06-16 NOTE — PROGRESS NOTES
06/16/23 0925   Appointment Info   Signing Clinician's Name / Credentials (OT) Lay Nasrin, OTR/L   Living Environment   People in Home alone  (will have help from his kids)   Current Living Arrangements house   Living Environment Comments has standard toilet and tub shower   Self-Care   Usual Activity Tolerance moderate   Current Activity Tolerance moderate   Activity/Exercise/Self-Care Comment Pt was independent with ADLS and IADLS prior to admit, but it was hard.  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   Instrumental Activities of Daily Living (IADL)   IADL Comments Family will do until pt is recovered   General Information   Onset of Illness/Injury or Date of Surgery 06/15/23   Referring Physician Lopez Guerra   Patient/Family Therapy Goal Statement (OT) get my life back. the pain was so incredible before surgery   Additional Occupational Profile Info/Pertinent History of Current Problem pt admitted for elective lami-L3-L5   Existing Precautions/Restrictions brace worn when out of bed;no pivoting or twisting;lifting;spinal   Cognitive Status Examination   Affect/Mental Status (Cognitive) WNL   Range of Motion Comprehensive   General Range of Motion no range of motion deficits identified   Strength Comprehensive (MMT)   General Manual Muscle Testing (MMT) Assessment no strength deficits identified   Bed Mobility   Comment (Bed Mobility) mod   Transfers   Transfer Comments min   Activities of Daily Living   BADL Assessment/Intervention lower body dressing;toileting   Lower Body Dressing Assessment/Training   [place] Level (Lower Body Dressing) moderate assist (50% patient effort)   Toileting   [place] Level (Toileting) moderate assist (50% patient effort)   Clinical Impression   Criteria for Skilled Therapeutic Interventions Met (OT) Yes, treatment indicated   OT Diagnosis Decreased independence with adls   OT Problem List-Impairments impacting ADL pain;post-surgical precautions   Assessment of Occupational  Performance 3-5 Performance Deficits   Identified Performance Deficits dressing, transfers, bed mobility, toileting   Planned Therapy Interventions (OT) ADL retraining;bed mobility training;transfer training;home program guidelines   Clinical Decision Making Complexity (OT) moderate complexity   Anticipated Equipment Needs Upon Discharge (OT) reacher;raised toilet seat   Risk & Benefits of therapy have been explained evaluation/treatment results reviewed;care plan/treatment goals reviewed;risks/benefits reviewed;current/potential barriers reviewed;participants voiced agreement with care plan;participants included;patient   OT Total Evaluation Time   OT Eval, Moderate Complexity Minutes (83932) 15   OT Goals   Therapy Frequency (OT) 2 times/day   OT Predicted Duration/Target Date for Goal Attainment 06/18/23   OT Goals Lower Body Dressing;Bed Mobility;Toilet Transfer/Toileting   OT: Lower Body Dressing Modified independent;within precautions;using adaptive equipment   OT: Bed Mobility Modified independent;supine to/from sitting;rolling;bridging;within precautions   OT: Toilet Transfer/Toileting Modified independent;toilet transfer;cleaning and garment management;within precautions   Interventions   Interventions Quick Adds Self-Care/Home Management   Self-Care/Home Management   Self-Care/Home Mgmt/ADL, Compensatory, Meal Prep Minutes (11843) 35   Symptoms Noted During/After Treatment (Meal Preparation/Planning Training) fatigue;increased pain   Treatment Detail/Skilled Intervention Taught pt how to perform adls following spine precautions: total body dressing, including ice  and brace, transfers to and from bed, chair, toilet, car, and bed mobility using the log roll.  Pt min assist with all after OT intervention.  Gave handouts and answered all questions. Will see pt again to continue to increase ind after spine surgery.   OT Discharge Planning   OT Plan 6/18-adls using correct body mechanics, bed mob, with  log roll, toileting, transfers to car, toilet, etc   OT Discharge Recommendation (DC Rec) (S)  home   OT Rationale for DC Rec Pt has good support at home   OT Brief overview of current status Pt needs assist of one for adls and functional mobility   Total Session Time   Timed Code Treatment Minutes 35   Total Session Time (sum of timed and untimed services) 50

## 2023-06-16 NOTE — PLAN OF CARE
Problem: Plan of Care - These are the overarching goals to be used throughout the patient stay.    Goal: Optimal Comfort and Wellbeing  Outcome: Progressing     Problem: Spinal Surgery  Goal: Optimal Pain Control and Function  Outcome: Progressing    Patient alert and oriented x 4. Vital signs stable, on room air. Pleasant and cooperative with cares. Saline locked. Tolerating regular diet. Dressing to surgical incision is clean, dry and intact. Marie catheter remove. Hemovac drain in place with output of 30ml.

## 2023-06-16 NOTE — PROGRESS NOTES
"[facility]    Medicine Progress Note - Hospitalist Service    Date of Admission:  6/15/2023    67-year-old man with morbid obesity hypertension remote Hodgkin's lymphoma sleep apnea admitted for elective lumbar spine surgery  With Dr. Guerra yesterday on Jacqueline 15    Assessment & Plan   Principal Problem:    Lumbar back pain (6/15/2023)  Active Problems:    Spondylolisthesis of lumbar region (10/30/2019)    Hx of lumbosacral spine surgery (6/15/2023)    History of Hodgkin's lymphoma (12/4/2017)    Hyperlipemia (6/15/2023)    Morbid obesity (H) (6/15/2023)    Benign essential hypertension (11/4/2011)    Sleep apnea (6/15/2023)    Plan satisfactory postop progress in the first 24 hours he still has the lumbar drain in Home when able per spine surgeon.         Diet: Advance Diet as Tolerated: Regular Diet Adult  Discharge Instruction - Regular Diet Adult    DVT Prophylaxis: Pneumatic Compression Devices  Marie Catheter: Not present  Lines: None     Cardiac Monitoring: None  Code Status: Full Code      Clinically Significant Risk Factors                  # Hypertension: Noted on problem list        # Severe Obesity: Estimated body mass index is 41.83 kg/m  as calculated from the following:    Height as of this encounter: 1.778 m (5' 10\").    Weight as of this encounter: 132.2 kg (291 lb 8 oz)., PRESENT ON ADMISSION          Disposition Plan      Expected Discharge Date: 06/17/2023      Destination: home with family  Discharge Comments: pending drain          Jluis Eugene MD  Hospitalist Service  [facility]  Securely message with [employer] (more info)  Text page via [employer] Paging/Directory   ______________________________________________________________________    Interval History   He is pleased with the outcome of surgery pain is controlled    Physical Exam   Vital Signs: Temp: 98.3  F (36.8  C) Temp src: Oral BP: 133/72 Pulse: 98   Resp: 18 SpO2: 94 % O2 Device: " None (Room air)    Weight: 291 lbs 8 oz    Flushed face lying flat on back comfortable    Medical Decision Making      Total time 25 minutes

## 2023-06-16 NOTE — PROGRESS NOTES
"Orthopedic Progress Note      Assessment: 1 Day Post-Op  S/P Procedure(s):  BILATERAL LUMBAR 2-3, LUMBAR 3-4 AND LUMBAR 4-5 LAMINECTOMY @    Plan:   - Continue PT/OT  - Weightbearing status: WBAT can use brace for comfort  - No bending, twisting or lifting more than 10 pounds for 6 weeks.  - Drain can be removed when output is <30cc for 2 consecutive shifts or POD3  - Anticoagulation: no chemical prophylaxis in addition to SCDs, ney stockings and early ambulation.  - Antibiotics: 2 grams of Ancef IV q 8 hours until drain is removed  - Orthosis consult placed for LSO brace  - Pain control at discharge: Oxycodone 5 mg 1-2 tabs Q4-6 hours x30-32 tabs, Hydroxyzine 25 mg QID PRN, Gabapentin 100 mg TID, Tizanidine 4 mg TID, Acetaminophen 1000 mg TID  - Discharge planning: pending drain output, BM, pain control and progression in therapy       Subjective:  Pain: mild   Chest pain, SOB: no  Nausea, Vomiting:  no  Lightheadedness, Dizziness:  no  Neuro:  Patient denies new onset numbness or paresthesias    Patient is doing well POD1. Is ambulating, tolerating oral intake and voiding well without issue. Drain is in place with 30 out overnight. Prior to surgery symptoms are improving.     Objective:  /60 (BP Location: Left arm)   Pulse 79   Temp 98.1  F (36.7  C) (Oral)   Resp 18   Ht 1.778 m (5' 10\")   Wt 132.2 kg (291 lb 8 oz)   SpO2 94%   BMI 41.83 kg/m    The patient is A&Ox3. Appears comfortable.   Sensation is intact.  Dorsiflexion and plantar flexion is intact.  Dorsalis pedis pulse intact.  Calves are soft and non-tender. Negative Andres's.  The incision is covered. Dressing C/D/I.  Drain with 30 out overnight    Pertinent Labs   Lab Results: personally reviewed.   No results found for: INR, PROTIME  Lab Results   Component Value Date    HGB 11.7 (L) 06/16/2023     No results found for: NA, CO2      Report completed by:  Hali Gill PA-C/Dr. Guerra  [facility]    Date: 6/16/2023  Time: " 11:16 AM

## 2023-06-16 NOTE — PLAN OF CARE
Problem: Spinal Surgery  Goal: Absence of Bleeding  Outcome: Progressing   Dressing clean, dry and intact.     Problem: Spinal Surgery  Goal: Effective Bowel Elimination  Outcome: Progressing   Passing flatus. No complaints of nausea.     Problem: Spinal Surgery  Goal: Optimal Pain Control and Function  Intervention: Prevent or Manage Pain  Recent Flowsheet Documentation  Taken 6/16/2023 1047 by Sandee Roman RN  Pain Management Interventions: cold applied   Patient states that pain is 8/10 this afternoon. Acetaminophen and hydroxyzine given. Pain Team Consult. Ice applied.     Problem: Spinal Surgery  Goal: Effective Urinary Elimination  Outcome: Progressing   Goal Outcome Evaluation:       Urinating without difficulty.

## 2023-06-16 NOTE — PLAN OF CARE
Goal Outcome Evaluation:  Patient vital signs are at baseline: Yes  Patient able to ambulate as they were prior to admission or with assist devices provided by therapies during their stay:  Yes  Patient MUST void prior to discharge:  Yes  Patient able to tolerate oral intake:  Yes  Pain has adequate pain control using Oral analgesics:  No,  Reason:  Utilized PRN IV Dilaudid.   Does patient have an identified :  Yes  Has goal D/C date and time been discussed with patient:  No,  Reason:  Pending medical clearance.   Patient is alert and oriented X4. Pain controlled with PRN oral and IV pain medications. Utilized ice packs  for comfort. IV saline locked. Bed alarms activated for safety.

## 2023-06-17 LAB
HGB BLD-MCNC: 12.3 G/DL (ref 13.3–17.7)
LACTATE SERPL-SCNC: 0.7 MMOL/L (ref 0.7–2)

## 2023-06-17 PROCEDURE — 83605 ASSAY OF LACTIC ACID: CPT | Performed by: INTERNAL MEDICINE

## 2023-06-17 PROCEDURE — 250N000013 HC RX MED GY IP 250 OP 250 PS 637: Performed by: INTERNAL MEDICINE

## 2023-06-17 PROCEDURE — 85018 HEMOGLOBIN: CPT

## 2023-06-17 PROCEDURE — 250N000011 HC RX IP 250 OP 636

## 2023-06-17 PROCEDURE — 36415 COLL VENOUS BLD VENIPUNCTURE: CPT | Performed by: INTERNAL MEDICINE

## 2023-06-17 PROCEDURE — 99232 SBSQ HOSP IP/OBS MODERATE 35: CPT | Performed by: INTERNAL MEDICINE

## 2023-06-17 PROCEDURE — 36415 COLL VENOUS BLD VENIPUNCTURE: CPT

## 2023-06-17 PROCEDURE — 250N000013 HC RX MED GY IP 250 OP 250 PS 637: Performed by: PHYSICIAN ASSISTANT

## 2023-06-17 PROCEDURE — 120N000001 HC R&B MED SURG/OB

## 2023-06-17 RX ADMIN — OXYCODONE HYDROCHLORIDE 5 MG: 5 TABLET ORAL at 00:12

## 2023-06-17 RX ADMIN — OXYCODONE HYDROCHLORIDE 5 MG: 5 TABLET ORAL at 06:52

## 2023-06-17 RX ADMIN — CEFAZOLIN SODIUM 2 G: 2 INJECTION, SOLUTION INTRAVENOUS at 09:42

## 2023-06-17 RX ADMIN — THERA TABS 1 TABLET: TAB at 09:42

## 2023-06-17 RX ADMIN — POLYETHYLENE GLYCOL 3350 17 G: 17 POWDER, FOR SOLUTION ORAL at 09:42

## 2023-06-17 RX ADMIN — OXYCODONE HYDROCHLORIDE 10 MG: 5 TABLET ORAL at 13:21

## 2023-06-17 RX ADMIN — LISINOPRIL 20 MG: 20 TABLET ORAL at 09:42

## 2023-06-17 RX ADMIN — SENNOSIDES AND DOCUSATE SODIUM 1 TABLET: 50; 8.6 TABLET ORAL at 20:39

## 2023-06-17 RX ADMIN — CEFAZOLIN SODIUM 2 G: 2 INJECTION, SOLUTION INTRAVENOUS at 16:44

## 2023-06-17 RX ADMIN — ATORVASTATIN CALCIUM 20 MG: 10 TABLET, FILM COATED ORAL at 06:52

## 2023-06-17 RX ADMIN — GABAPENTIN 600 MG: 300 CAPSULE ORAL at 09:42

## 2023-06-17 RX ADMIN — SENNOSIDES AND DOCUSATE SODIUM 1 TABLET: 50; 8.6 TABLET ORAL at 09:42

## 2023-06-17 RX ADMIN — OXYCODONE HYDROCHLORIDE 10 MG: 5 TABLET ORAL at 17:53

## 2023-06-17 RX ADMIN — OXYCODONE HYDROCHLORIDE 10 MG: 5 TABLET ORAL at 23:04

## 2023-06-17 RX ADMIN — CEFAZOLIN SODIUM 2 G: 2 INJECTION, SOLUTION INTRAVENOUS at 00:19

## 2023-06-17 RX ADMIN — GABAPENTIN 600 MG: 300 CAPSULE ORAL at 20:39

## 2023-06-17 ASSESSMENT — ACTIVITIES OF DAILY LIVING (ADL)
ADLS_ACUITY_SCORE: 25
ADLS_ACUITY_SCORE: 25
ADLS_ACUITY_SCORE: 23
ADLS_ACUITY_SCORE: 25

## 2023-06-17 NOTE — PLAN OF CARE
Goal Outcome Evaluation:      Plan of Care Reviewed With: patient    Overall Patient Progress: improvingOverall Patient Progress: improving    Outcome Evaluation: Pt is alert and oriented x 4. Pain controlled with PO oxy. Noted skin was warm so an axillary temp was taken and it was 100.5. The sepsis protocol fired. Lactic 0.7. No other s/s of infection noted and fever resolved on its own. (No tylenol ordered.) Drain removed after output was 25, then 5. New dressing applied. Incision looks CDI. CMS intact. Plan to discharge home when all goals are met.      Problem: Spinal Surgery  Goal: Absence of Infection Signs and Symptoms  Outcome: Progressing     Problem: Spinal Surgery  Goal: Optimal Pain Control and Function  Outcome: Progressing  Intervention: Prevent or Manage Pain  Recent Flowsheet Documentation  Taken 6/17/2023 0652 by Rufus Lainez, RN  Pain Management Interventions:   medication (see MAR)   distraction   emotional support   repositioned   rest  Taken 6/17/2023 0012 by Rufus Lainez, RN  Pain Management Interventions:   medication (see MAR)   distraction   emotional support   repositioned   rest     Rufus Lainez RN, ONC

## 2023-06-17 NOTE — PROGRESS NOTES
"[facility]    Medicine Progress Note - Hospitalist Service    Date of Admission:  6/15/2023    67-year-old man with morbid obesity hypertension remote Hodgkin's lymphoma sleep apnea admitted for elective lumbar spine surgery  With Dr. Guerra  on Jacqueline 15    Assessment & Plan   Principal Problem:    Lumbar back pain (6/15/2023)  Active Problems:    Spondylolisthesis of lumbar region (10/30/2019)    Hx of lumbosacral spine surgery (6/15/2023)    History of Hodgkin's lymphoma (12/4/2017)    Hyperlipemia (6/15/2023)    Morbid obesity (H) (6/15/2023)    Benign essential hypertension (11/4/2011)    Sleep apnea (6/15/2023)    no new medical issue. Ortho was planning to discharge today. One T isolated overnight 100, Vitals within normal limit this am afebrile. Discharge order placed by ortho this am but he has experienced  more post op pain and feeling  more tired today than yesterday so discharge is post poned to tomorrow.         Diet: Advance Diet as Tolerated: Regular Diet Adult  Discharge Instruction - Regular Diet Adult    DVT Prophylaxis: Pneumatic Compression Devices  Marie Catheter: Not present  Lines: None     Cardiac Monitoring: None  Code Status: Full Code      Clinically Significant Risk Factors                  # Hypertension: Noted on problem list        # Severe Obesity: Estimated body mass index is 41.83 kg/m  as calculated from the following:    Height as of this encounter: 1.778 m (5' 10\").    Weight as of this encounter: 132.2 kg (291 lb 8 oz)., PRESENT ON ADMISSION          Disposition Plan      Expected Discharge Date: 06/18/2023, 12:00 PM    Destination: home with family  Discharge Comments: pending pain          Wali Swan MD  Hospitalist Service  [facility]  Securely message with [employer] (more info)  Text page via [employer] Paging/Directory   ______________________________________________________________________    Interval History   Has " more post surgical pain and tired to day. NO SOB or chest pain, no fever today. Stable vitals. Use PAP for napping for OTTO    Physical Exam   Vital Signs: Temp: 98.9  F (37.2  C) Temp src: Axillary BP: 106/52 Pulse: 80   Resp: 19 SpO2: 93 % O2 Device: BiPAP/CPAP    Weight: 291 lbs 8 oz    Exam:  Constitutional: alert, no distress and cooperative  Head  normocephalic   Neck:   No JVD  ENT: Pupils symmetrical, sclera anicteric,  Cardiac:  No murmurs, clicks gallops or rub,   Respiratory: Breathing comfortably. Lungs clear  Neurologic:oriented and appropriate, grossly non focal      Medical Decision Making      Wali Swan MD    Total time 25 minutes

## 2023-06-17 NOTE — PLAN OF CARE
Problem: Plan of Care - These are the overarching goals to be used throughout the patient stay.    Goal: Optimal Comfort and Wellbeing  Intervention: Monitor Pain and Promote Comfort  Recent Flowsheet Documentation  Taken 6/17/2023 1321 by Sandee Roman, RN  Pain Management Interventions: repositioned  Taken 6/17/2023 0806 by Sandee Roman, RN  Pain Management Interventions: repositioned     Problem: Spinal Surgery  Goal: Nausea and Vomiting Relief  Outcome: Progressing     Problem: Spinal Surgery  Goal: Effective Urinary Elimination  Outcome: Progressing     Problem: Spinal Surgery  Goal: Effective Oxygenation and Ventilation  Outcome: Progressing   Goal Outcome Evaluation:       Patient stated that his pain is 8/10 this afternoon. Oxycodone given with moderate relief. Voiding in good amounts. Up in chair. States that he is too painful to walk in the dubose. Passing flatus no BM since surgery

## 2023-06-17 NOTE — PROGRESS NOTES
"Ortho pod2    No issues  /52 (BP Location: Left arm)   Pulse 80   Temp 98.9  F (37.2  C) (Axillary)   Resp 19   Ht 1.778 m (5' 10\")   Wt 132.2 kg (291 lb 8 oz)   SpO2 93%   BMI 41.83 kg/m      drsg dry  Drain out    Plan - discharge today  "

## 2023-06-17 NOTE — PLAN OF CARE
Problem: Plan of Care - These are the overarching goals to be used throughout the patient stay.    Goal: Optimal Comfort and Wellbeing  Outcome: Progressing   Goal Outcome Evaluation:         Vital signs stable, on Room Air. Tolerating Regular diet. IV saline locked. Ice pack for pain and comfort.

## 2023-06-18 ENCOUNTER — APPOINTMENT (OUTPATIENT)
Dept: RADIOLOGY | Facility: CLINIC | Age: 68
DRG: 519 | End: 2023-06-18
Attending: INTERNAL MEDICINE
Payer: MEDICARE

## 2023-06-18 ENCOUNTER — APPOINTMENT (OUTPATIENT)
Dept: PHYSICAL THERAPY | Facility: CLINIC | Age: 68
DRG: 519 | End: 2023-06-18
Payer: MEDICARE

## 2023-06-18 LAB
ALBUMIN SERPL-MCNC: 3.1 G/DL (ref 3.5–5)
ALBUMIN UR-MCNC: 30 MG/DL
ALP SERPL-CCNC: 82 U/L (ref 45–120)
ALT SERPL W P-5'-P-CCNC: 11 U/L (ref 0–45)
ANION GAP SERPL CALCULATED.3IONS-SCNC: 7 MMOL/L (ref 5–18)
APPEARANCE UR: CLEAR
AST SERPL W P-5'-P-CCNC: 16 U/L (ref 0–40)
BASOPHILS # BLD AUTO: 0 10E3/UL (ref 0–0.2)
BASOPHILS NFR BLD AUTO: 0 %
BILIRUB SERPL-MCNC: 1.4 MG/DL (ref 0–1)
BILIRUB UR QL STRIP: NEGATIVE
BUN SERPL-MCNC: 21 MG/DL (ref 8–22)
CALCIUM SERPL-MCNC: 9.2 MG/DL (ref 8.5–10.5)
CHLORIDE BLD-SCNC: 100 MMOL/L (ref 98–107)
CO2 SERPL-SCNC: 23 MMOL/L (ref 22–31)
COLOR UR AUTO: YELLOW
CREAT SERPL-MCNC: 0.91 MG/DL (ref 0.7–1.3)
EOSINOPHIL # BLD AUTO: 0 10E3/UL (ref 0–0.7)
EOSINOPHIL NFR BLD AUTO: 1 %
ERYTHROCYTE [DISTWIDTH] IN BLOOD BY AUTOMATED COUNT: 13.2 % (ref 10–15)
GFR SERPL CREATININE-BSD FRML MDRD: >90 ML/MIN/1.73M2
GLUCOSE BLD-MCNC: 103 MG/DL (ref 70–125)
GLUCOSE UR STRIP-MCNC: NEGATIVE MG/DL
HCT VFR BLD AUTO: 36.5 % (ref 40–53)
HGB BLD-MCNC: 12.1 G/DL (ref 13.3–17.7)
HGB BLD-MCNC: 12.7 G/DL (ref 13.3–17.7)
HGB UR QL STRIP: NEGATIVE
IMM GRANULOCYTES # BLD: 0 10E3/UL
IMM GRANULOCYTES NFR BLD: 1 %
KETONES UR STRIP-MCNC: ABNORMAL MG/DL
LACTATE SERPL-SCNC: 0.9 MMOL/L (ref 0.7–2)
LEUKOCYTE ESTERASE UR QL STRIP: NEGATIVE
LYMPHOCYTES # BLD AUTO: 1.9 10E3/UL (ref 0.8–5.3)
LYMPHOCYTES NFR BLD AUTO: 22 %
MAGNESIUM SERPL-MCNC: 2.5 MG/DL (ref 1.8–2.6)
MCH RBC QN AUTO: 28.2 PG (ref 26.5–33)
MCHC RBC AUTO-ENTMCNC: 33.2 G/DL (ref 31.5–36.5)
MCV RBC AUTO: 85 FL (ref 78–100)
MONOCYTES # BLD AUTO: 0.9 10E3/UL (ref 0–1.3)
MONOCYTES NFR BLD AUTO: 11 %
MUCOUS THREADS #/AREA URNS LPF: PRESENT /LPF
NEUTROPHILS # BLD AUTO: 5.4 10E3/UL (ref 1.6–8.3)
NEUTROPHILS NFR BLD AUTO: 65 %
NITRATE UR QL: NEGATIVE
NRBC # BLD AUTO: 0 10E3/UL
NRBC BLD AUTO-RTO: 0 /100
OSMOLALITY SERPL: 278 MMOL/KG (ref 280–301)
OSMOLALITY UR: 904 MMOL/KG (ref 100–1200)
PH UR STRIP: 5.5 [PH] (ref 5–7)
PLATELET # BLD AUTO: 177 10E3/UL (ref 150–450)
POTASSIUM BLD-SCNC: 4.4 MMOL/L (ref 3.5–5)
PROCALCITONIN SERPL-MCNC: 0.17 NG/ML (ref 0–0.49)
PROT SERPL-MCNC: 6.6 G/DL (ref 6–8)
RBC # BLD AUTO: 4.29 10E6/UL (ref 4.4–5.9)
RBC URINE: 1 /HPF
SODIUM SERPL-SCNC: 130 MMOL/L (ref 136–145)
SODIUM UR-SCNC: 46 MMOL/L
SP GR UR STRIP: >1.03 (ref 1–1.03)
UROBILINOGEN UR STRIP-MCNC: <2 MG/DL
WBC # BLD AUTO: 8.3 10E3/UL (ref 4–11)
WBC URINE: 3 /HPF

## 2023-06-18 PROCEDURE — 99233 SBSQ HOSP IP/OBS HIGH 50: CPT | Performed by: INTERNAL MEDICINE

## 2023-06-18 PROCEDURE — 83930 ASSAY OF BLOOD OSMOLALITY: CPT | Performed by: INTERNAL MEDICINE

## 2023-06-18 PROCEDURE — 83605 ASSAY OF LACTIC ACID: CPT | Performed by: INTERNAL MEDICINE

## 2023-06-18 PROCEDURE — 80053 COMPREHEN METABOLIC PANEL: CPT | Performed by: INTERNAL MEDICINE

## 2023-06-18 PROCEDURE — 36415 COLL VENOUS BLD VENIPUNCTURE: CPT | Performed by: INTERNAL MEDICINE

## 2023-06-18 PROCEDURE — 87040 BLOOD CULTURE FOR BACTERIA: CPT | Performed by: INTERNAL MEDICINE

## 2023-06-18 PROCEDURE — 250N000013 HC RX MED GY IP 250 OP 250 PS 637: Performed by: INTERNAL MEDICINE

## 2023-06-18 PROCEDURE — 84145 PROCALCITONIN (PCT): CPT | Performed by: INTERNAL MEDICINE

## 2023-06-18 PROCEDURE — 81001 URINALYSIS AUTO W/SCOPE: CPT | Performed by: INTERNAL MEDICINE

## 2023-06-18 PROCEDURE — 85018 HEMOGLOBIN: CPT

## 2023-06-18 PROCEDURE — 250N000011 HC RX IP 250 OP 636

## 2023-06-18 PROCEDURE — 83735 ASSAY OF MAGNESIUM: CPT | Performed by: INTERNAL MEDICINE

## 2023-06-18 PROCEDURE — 250N000013 HC RX MED GY IP 250 OP 250 PS 637

## 2023-06-18 PROCEDURE — 120N000001 HC R&B MED SURG/OB

## 2023-06-18 PROCEDURE — 84300 ASSAY OF URINE SODIUM: CPT | Performed by: INTERNAL MEDICINE

## 2023-06-18 PROCEDURE — 71045 X-RAY EXAM CHEST 1 VIEW: CPT

## 2023-06-18 PROCEDURE — 250N000013 HC RX MED GY IP 250 OP 250 PS 637: Performed by: PHYSICIAN ASSISTANT

## 2023-06-18 PROCEDURE — 97530 THERAPEUTIC ACTIVITIES: CPT | Mod: GP

## 2023-06-18 PROCEDURE — 83935 ASSAY OF URINE OSMOLALITY: CPT | Performed by: INTERNAL MEDICINE

## 2023-06-18 PROCEDURE — 36415 COLL VENOUS BLD VENIPUNCTURE: CPT

## 2023-06-18 PROCEDURE — 250N000011 HC RX IP 250 OP 636: Performed by: PHYSICIAN ASSISTANT

## 2023-06-18 PROCEDURE — 97164 PT RE-EVAL EST PLAN CARE: CPT | Mod: GP

## 2023-06-18 PROCEDURE — 85025 COMPLETE CBC W/AUTO DIFF WBC: CPT | Performed by: INTERNAL MEDICINE

## 2023-06-18 PROCEDURE — 97110 THERAPEUTIC EXERCISES: CPT | Mod: GP

## 2023-06-18 RX ORDER — ACETAMINOPHEN 325 MG/1
975 TABLET ORAL ONCE
Status: COMPLETED | OUTPATIENT
Start: 2023-06-18 | End: 2023-06-18

## 2023-06-18 RX ORDER — DEXAMETHASONE SODIUM PHOSPHATE 4 MG/ML
4 INJECTION, SOLUTION INTRA-ARTICULAR; INTRALESIONAL; INTRAMUSCULAR; INTRAVENOUS; SOFT TISSUE EVERY 6 HOURS
Status: COMPLETED | OUTPATIENT
Start: 2023-06-18 | End: 2023-06-19

## 2023-06-18 RX ORDER — ACETAMINOPHEN 325 MG/1
650 TABLET ORAL EVERY 4 HOURS PRN
Status: DISCONTINUED | OUTPATIENT
Start: 2023-06-18 | End: 2023-06-20 | Stop reason: HOSPADM

## 2023-06-18 RX ORDER — OXYCODONE HYDROCHLORIDE 5 MG/1
5 TABLET ORAL EVERY 4 HOURS PRN
Qty: 32 TABLET | Refills: 0 | Status: SHIPPED | OUTPATIENT
Start: 2023-06-18 | End: 2023-06-20

## 2023-06-18 RX ORDER — HYDROXYZINE HYDROCHLORIDE 10 MG/1
10 TABLET, FILM COATED ORAL 3 TIMES DAILY PRN
Qty: 30 TABLET | Refills: 0 | Status: SHIPPED | OUTPATIENT
Start: 2023-06-18 | End: 2023-06-20

## 2023-06-18 RX ORDER — ACETAMINOPHEN 325 MG/1
650 TABLET ORAL EVERY 4 HOURS PRN
Qty: 60 TABLET | Refills: 0 | Status: SHIPPED | OUTPATIENT
Start: 2023-06-18

## 2023-06-18 RX ORDER — HYDROXYZINE HYDROCHLORIDE 10 MG/1
10 TABLET, FILM COATED ORAL 3 TIMES DAILY PRN
Status: DISCONTINUED | OUTPATIENT
Start: 2023-06-18 | End: 2023-06-20 | Stop reason: HOSPADM

## 2023-06-18 RX ADMIN — SENNOSIDES AND DOCUSATE SODIUM 1 TABLET: 50; 8.6 TABLET ORAL at 20:01

## 2023-06-18 RX ADMIN — DEXAMETHASONE SODIUM PHOSPHATE 4 MG: 4 INJECTION, SOLUTION INTRAMUSCULAR; INTRAVENOUS at 10:32

## 2023-06-18 RX ADMIN — SENNOSIDES AND DOCUSATE SODIUM 1 TABLET: 50; 8.6 TABLET ORAL at 08:29

## 2023-06-18 RX ADMIN — HYDROXYZINE HYDROCHLORIDE 10 MG: 10 TABLET ORAL at 08:29

## 2023-06-18 RX ADMIN — CHOLECALCIFEROL TAB 10 MCG (400 UNIT) 400 UNITS: 10 TAB at 08:29

## 2023-06-18 RX ADMIN — THERA TABS 1 TABLET: TAB at 08:28

## 2023-06-18 RX ADMIN — HYDROMORPHONE HYDROCHLORIDE 0.4 MG: 0.2 INJECTION, SOLUTION INTRAMUSCULAR; INTRAVENOUS; SUBCUTANEOUS at 01:23

## 2023-06-18 RX ADMIN — OXYCODONE HYDROCHLORIDE 10 MG: 5 TABLET ORAL at 03:00

## 2023-06-18 RX ADMIN — LISINOPRIL 20 MG: 20 TABLET ORAL at 08:29

## 2023-06-18 RX ADMIN — CEFAZOLIN SODIUM 2 G: 2 INJECTION, SOLUTION INTRAVENOUS at 00:54

## 2023-06-18 RX ADMIN — OXYCODONE HYDROCHLORIDE 10 MG: 5 TABLET ORAL at 15:54

## 2023-06-18 RX ADMIN — POLYETHYLENE GLYCOL 3350 17 G: 17 POWDER, FOR SOLUTION ORAL at 08:31

## 2023-06-18 RX ADMIN — GABAPENTIN 600 MG: 300 CAPSULE ORAL at 20:01

## 2023-06-18 RX ADMIN — ACETAMINOPHEN 650 MG: 325 TABLET ORAL at 14:56

## 2023-06-18 RX ADMIN — ATORVASTATIN CALCIUM 20 MG: 10 TABLET, FILM COATED ORAL at 07:10

## 2023-06-18 RX ADMIN — OXYCODONE HYDROCHLORIDE 10 MG: 5 TABLET ORAL at 07:09

## 2023-06-18 RX ADMIN — GABAPENTIN 600 MG: 300 CAPSULE ORAL at 08:29

## 2023-06-18 RX ADMIN — DEXAMETHASONE SODIUM PHOSPHATE 4 MG: 4 INJECTION, SOLUTION INTRAMUSCULAR; INTRAVENOUS at 21:12

## 2023-06-18 RX ADMIN — OXYCODONE HYDROCHLORIDE 10 MG: 5 TABLET ORAL at 19:55

## 2023-06-18 RX ADMIN — OXYCODONE HYDROCHLORIDE 10 MG: 5 TABLET ORAL at 11:33

## 2023-06-18 RX ADMIN — DEXAMETHASONE SODIUM PHOSPHATE 4 MG: 4 INJECTION, SOLUTION INTRAMUSCULAR; INTRAVENOUS at 15:56

## 2023-06-18 RX ADMIN — ACETAMINOPHEN 975 MG: 325 TABLET ORAL at 00:47

## 2023-06-18 ASSESSMENT — ACTIVITIES OF DAILY LIVING (ADL)
ADLS_ACUITY_SCORE: 25
ADLS_ACUITY_SCORE: 29
ADLS_ACUITY_SCORE: 27
ADLS_ACUITY_SCORE: 29

## 2023-06-18 NOTE — PROGRESS NOTES
Informed of Fever of 101, RN indicate also last night -- POD-3 -- no tylenol ordered -- requesting tylenol     Plans- Tylenol ordered, Blood cultures, CBC,procal, UA

## 2023-06-18 NOTE — PLAN OF CARE
Problem: Plan of Care - These are the overarching goals to be used throughout the patient stay.    Goal: Optimal Comfort and Wellbeing  Intervention: Monitor Pain and Promote Comfort  Recent Flowsheet Documentation  Taken 6/18/2023 1133 by Sandee Roman, RN  Pain Management Interventions:   cold applied   repositioned   medication (see MAR)  Taken 6/18/2023 0800 by Sandee Roman, RN  Pain Management Interventions: cold applied   Patient states that his pain is severe in his lower back and it shoots down his right leg. The pain prohibits ambulation and the patient has been unable to ambulate from chair to bed today. Mathews MARY notified and new orders obtained.     Problem: Spinal Surgery  Goal: Fluid and Electrolyte Balance  Outcome: Progressing   Patient Na 130 and he is on a 1500 ml fluid restriction.    Problem: Spinal Surgery  Goal: Optimal Functional Ability  Outcome: Progressing   Goal Outcome Evaluation:  Unable to ambulate today due to pain.

## 2023-06-18 NOTE — PROGRESS NOTES
06/18/23 1330   Appointment Info   Signing Clinician's Name / Credentials (PT) Joi Unger, PT, DPT   Living Environment   Living Environment Comments see initial eval   Self-Care   Usual Activity Tolerance moderate   Current Activity Tolerance poor   Equipment Currently Used at Home none   Fall history within last six months no   General Information   Onset of Illness/Injury or Date of Surgery 06/15/23   Referring Physician Lopez Guerra MD   Pertinent History of Current Problem (include personal factors and/or comorbidities that impact the POC) s/p L2-5 Lami 6/15   Existing Precautions/Restrictions spinal   Weight-Bearing Status - LLE weight-bearing as tolerated   Weight-Bearing Status - RLE weight-bearing as tolerated   Strength (Manual Muscle Testing)   Strength Comments generalized weakness   Bed Mobility   Bed Mobility sit-supine;rolling right   Rolling Right Bowman (Bed Mobility) maximum assist (25% patient effort);2 person assist   Sit-Supine Bowman (Bed Mobility) maximum assist (25% patient effort);2 person assist   Transfers   Transfers sit-stand transfer   Sit-Stand Transfer   Sit-Stand Bowman (Transfers) maximum assist (25% patient effort);2 person assist   Assistive Device (Sit-Stand Transfers) walker, front-wheeled   Comment, (Sit-Stand Transfer) unable to achieve full stand due to weakness   Gait/Stairs (Locomotion)   Distance in Feet (Gait) pivot transfer   Comment, (Gait/Stairs) unable to ambulate due to weakness   Clinical Impression   Criteria for Skilled Therapeutic Intervention Yes, treatment indicated   PT Diagnosis (PT) impaired functional mobility   Influenced by the following impairments weakness, pain   Functional limitations due to impairments stairs, gait, transfers   Clinical Presentation (PT Evaluation Complexity) Stable/Uncomplicated   Clinical Presentation Rationale pt presents as medically diagnosed   Clinical Decision Making (Complexity) moderate  complexity   Planned Therapy Interventions (PT) balance training;bed mobility training;gait training;home exercise program;patient/family education;ROM (range of motion);stair training;strengthening;transfer training   Anticipated Equipment Needs at Discharge (PT) walker, rolling;lift device   Risk & Benefits of therapy have been explained care plan/treatment goals reviewed;patient   PT Total Evaluation Time   PT Eval, Re-eval Minutes (08776) 13   Plan of Care Review   Plan of Care Reviewed With patient   Physical Therapy Goals   PT Frequency Daily   PT Predicted Duration/Target Date for Goal Attainment 06/25/23   PT: Transfers Modified independent;Sit to/from stand;Assistive device   PT: Gait Modified independent;Rolling walker;50 feet   PT: Stairs Supervision/stand-by assist;3 stairs;Rail on left   PT: Goal 1 SBA with written HEP for LE strengthening and ROM   Therapeutic Procedure/Exercise   Ther. Procedure: strength, endurance, ROM, flexibillity Minutes (08517) 8   Treatment Detail/Skilled Intervention seated LE therex x10 reps, frequent redirection to task as pt falling asleep, tactile and verbal cueing, CGA   Therapeutic Activity   Therapeutic Activities: dynamic activities to improve functional performance Minutes (83858) 15   Treatment Detail/Skilled Intervention sit to/from stand with Max A x2, pt standing with flexed hips and knees at 75% of transition but unable to fully extend x1 min, cueing to return to sitting, pt limited by weakness, EZ stand used for max A sit to stand, cueing for extension at hips and knees as able, sit to supine with max A x2, max A x2 for rolling and repositioning, pt limited by weakness and pain   Gait Training   Symptoms Noted During/After Treatment (Gait Training) fatigue;increased pain   Treatment Detail/Skilled Intervention cueing for posture and safety, bilat UE support on handles, cueing for hip and knee extension and eyes open   [place] Level (Gait Training) dependent  (less than 25% patient effort)   Physical Assistance Level (Gait Training) verbal cues   Weight Bearing (Gait Training) full weight-bearing   Assistive Device (Gait Training) other (see comments)  (EZ stand)   PT Discharge Planning   PT Plan transfers, standing, sitting, LE therex   PT Discharge Recommendation (DC Rec) (S)  Transitional Care Facility   PT Rationale for DC Rec TCU due to requiring mechanical lift for transfer due to weakness   PT Brief overview of current status EZ stand for transfer   Total Session Time   Timed Code Treatment Minutes 23   Total Session Time (sum of timed and untimed services) 36

## 2023-06-18 NOTE — PLAN OF CARE
Goal Outcome Evaluation:      Plan of Care Reviewed With: patient    Overall Patient Progress: improvingOverall Patient Progress: improving    Outcome Evaluation: Pt is alert and oriented x 4. Temp again tonight, 101.5. Pt is warm to the touch, no chills. MD orders labs, blood cx and UA. Complaints of sugey lower leg aching, CMS intact. PRN oxy and IV dilaudid given for breakthough pain. Pt weak tonight, hard to stand due to pain and stiffness. Pt up to chair for comfort. No concerning lab results. UA obtained. Blood cx pening. IV cefazolin continued q8hrs. Plan to discharge if pain controlled and medically stable.      Problem: Spinal Surgery  Goal: Absence of Infection Signs and Symptoms  Outcome: Progressing       Problem: Spinal Surgery  Goal: Optimal Pain Control and Function  Outcome: Progressing  Intervention: Prevent or Manage Pain  Recent Flowsheet Documentation  Taken 6/18/2023 0345 by Rufus Lainez, RN  Pain Management Interventions:   cold applied   repositioned  Taken 6/18/2023 0300 by Rufus Lainez, RN  Pain Management Interventions:   cold applied   distraction   emotional support   repositioned   rest  Taken 6/18/2023 0138 by Rufus Lainez, RN  Pain Management Interventions:   cold applied   distraction   emotional support   repositioned   rest  Taken 6/18/2023 0123 by Rufus Lainez, RN  Pain Management Interventions:   medication (see MAR)   distraction   emotional support   repositioned   rest   cold applied  Taken 6/18/2023 0015 by Rufus Lainez, RN  Pain Management Interventions: repositioned     Rufus Lainez Rn, ONC

## 2023-06-18 NOTE — PROVIDER NOTIFICATION
Sent at 0037 - Pt pod 3 of a laminectomy. pt has a 101.5 fever now. He does not have any Tylenol ordered, can we order it? also the pt had a low grade fever last night. iv cefazolin continued post op. do we want to do any more labs? Thanks    0041 - Spoke with Dr. Mclaughlin - he is putting in orders for labs, blood cultures, UA and tylenol.

## 2023-06-18 NOTE — PLAN OF CARE
Problem: Spinal Surgery  Goal: Optimal Coping with Surgery  Outcome: Progressing   Goal Outcome Evaluation:       Patient alert and oriented. VSS. On room air. Cooperative with cares. Pain of 7/10.   PRN Oxycodone given with relief. Voiding good amount. Tolerating Regular diet.

## 2023-06-18 NOTE — PROGRESS NOTES
"Orthopedic Progress Note      Assessment: 3 Day Post-Op  S/P Procedure(s):  BILATERAL LUMBAR 2-3, LUMBAR 3-4 AND LUMBAR 4-5 LAMINECTOMY @    Plan:   - Continue PT/OT  - Weightbearing status: WBAT can use brace for comfort  - No bending, twisting or lifting more than 10 pounds for 6 weeks.  - Drain out  - Anticoagulation: no chemical prophylaxis in addition to SCDs, ney stockings and early ambulation.  - Will order 4 doses of 4mg IV decadron q6h to see if this helps bilateral LE pain  - Antibiotics: D/C 2 grams of Ancef IV q 8 hours since drain is removed now  - Orthosis consult placed for LSO brace  - Discharge planning: pending drain output, BM, pain control and progression in therapy, fevers being managed by medicine      Subjective:  Pain: mild   Chest pain, SOB: no  Nausea, Vomiting:  no  Lightheadedness, Dizziness:  no  Neuro:  Patient denies new onset numbness or paresthesias    Patient is doing well POD3. Tolerating oral intake and voiding well without issue. Drain out. Notes pain at the lower extremities since backing off on mobilization. Feels it may improve if we try to move more with PT today.    Objective:  /58 (BP Location: Right arm)   Pulse 100   Temp 98.3  F (36.8  C) (Oral)   Resp 16   Ht 1.778 m (5' 10\")   Wt 132.2 kg (291 lb 8 oz)   SpO2 92%   BMI 41.83 kg/m    The patient is A&Ox3. Appears comfortable.   Sensation is intact.  Dorsiflexion and plantar flexion is intact.  Dorsalis pedis pulse intact.  Calves are soft and non-tender. Negative Andres's.  The incision is covered. Dressing C/D/I.  Drain out.    Pertinent Labs   Lab Results: personally reviewed.   No results found for: INR, PROTIME  Lab Results   Component Value Date    WBC 8.3 06/18/2023    HGB 12.7 (L) 06/18/2023    HCT 36.5 (L) 06/18/2023    MCV 85 06/18/2023     06/18/2023     Lab Results   Component Value Date     (L) 06/18/2023    CO2 23 06/18/2023         Report completed by:  Franchesca Jenkins, " MARY  [facility]

## 2023-06-18 NOTE — PROGRESS NOTES
Care Management Follow Up    Length of Stay (days): 3    Expected Discharge Date: 06/19/2023     Concerns to be Addressed:     Final discharge needs    Additional Information:  CM following along, patient with post op fever, on IV antibx, not moving well with PT yet, weak. Recommending TCU. Patient was hoping to go home. Will await for final dispo, once he's medically cleared to discharge.       Regina Waldron RN

## 2023-06-18 NOTE — PROGRESS NOTES
"[facility]    Medicine Progress Note - Hospitalist Service    Date of Admission:  6/15/2023    Assessment & Plan   67-year-old male who underwent bilateral L2-L5 laminectomy on 6/15/2023.  Patient noted to have mild hyponatremia on CMP from 6/18/23.     Hyponatremia secondary to SIADH  Sodium 130 mmol/L.  Sodium preop was 140 mmol/L  serum osmolality hypotonic hyponatremia  urine osmolality is elevated consistent with SIADH.   Order TSH with reflex T4.   Order free water restriction 1500mL.     Postoperative fever  tmax 101.5 degrees on morning 6/18/23.   UA negative.  Procalcitonin normal.  WBC normal.   Order chest Xray.   Blood cultures in process, NGTD>   Encourage IS.     Essential hypertension  Continue lisinopril 20 mg daily    Hyperlipidemia  Atorvastatin 20 mg daily    Postoperative anemia secondary to acute blood loss  Hemoglobin stable at 12.7 g/dL  No indication for transfusion  Patient asymptomatic.    Remote history of Hodgkin's lymphoma    Obstructive sleep apnea  Utilizing supplemental oxygen.  Keep head elevated during naps or bedtime.     Diet: Advance Diet as Tolerated: Regular Diet Adult  Discharge Instruction - Regular Diet Adult    DVT Prophylaxis: Defer to primary service  Marie Catheter: Not present  Lines: None     Cardiac Monitoring: None  Code Status: Full Code      Clinically Significant Risk Factors              # Hypoalbuminemia: Lowest albumin = 3.1 g/dL at 6/18/2023  1:08 AM, will monitor as appropriate     # Hypertension: Noted on problem list        # Severe Obesity: Estimated body mass index is 41.83 kg/m  as calculated from the following:    Height as of this encounter: 1.778 m (5' 10\").    Weight as of this encounter: 132.2 kg (291 lb 8 oz)., PRESENT ON ADMISSION          Disposition Plan      Expected Discharge Date: 06/18/2023, 12:00 PM    Destination: home with family  Discharge Comments: pending pain, fevers overnight          Panfilo Fountain" DO  Hospitalist Service  [facility]  Securely message with gauzz (more info)  Text page via [employer] Paging/Directory   ______________________________________________________________________    Interval History   Patient reports laying in bed for 2 days.  Had fever overnight to 101.5 degrees.  Denies cough, chest pain, shortness of breath, abdominal pain, nausea, vomiting, or diarrhea.     Physical Exam   Vital Signs: Temp: 98.3  F (36.8  C) Temp src: Oral BP: 114/58 Pulse: 100   Resp: 16 SpO2: 92 % O2 Device: None (Room air)    Weight: 291 lbs 8 oz    Constitutional: alert, no distress and cooperative  Head  normocephalic   Neck:   No JVD  ENT: Pupils symmetrical, sclera anicteric,  Cardiac:  No murmurs, clicks gallops or rub,   Respiratory: Breathing comfortably. Lungs clear  Neurologic:oriented and appropriate, grossly non focal    Medical Decision Making     40 MINUTES SPENT BY ME on the date of service doing chart review, history, exam, documentation & further activities per the note.      Data     I have personally reviewed the following data over the past 24 hrs:    8.3  \   12.7 (L)   / 177     130 (L) 100 21 /  103   4.4 23 0.91 \       ALT: 11 AST: 16 AP: 82 TBILI: 1.4 (H)   ALB: 3.1 (L) TOT PROTEIN: 6.6 LIPASE: N/A       Procal: 0.17 CRP: N/A Lactic Acid: 0.9         Imaging results reviewed over the past 24 hrs:   No results found for this or any previous visit (from the past 24 hour(s)).

## 2023-06-19 ENCOUNTER — APPOINTMENT (OUTPATIENT)
Dept: PHYSICAL THERAPY | Facility: CLINIC | Age: 68
DRG: 519 | End: 2023-06-19
Attending: ORTHOPAEDIC SURGERY
Payer: MEDICARE

## 2023-06-19 LAB
ANION GAP SERPL CALCULATED.3IONS-SCNC: 10 MMOL/L (ref 5–18)
BUN SERPL-MCNC: 29 MG/DL (ref 8–22)
CALCIUM SERPL-MCNC: 9.9 MG/DL (ref 8.5–10.5)
CHLORIDE BLD-SCNC: 99 MMOL/L (ref 98–107)
CO2 SERPL-SCNC: 23 MMOL/L (ref 22–31)
CREAT SERPL-MCNC: 0.89 MG/DL (ref 0.7–1.3)
ERYTHROCYTE [DISTWIDTH] IN BLOOD BY AUTOMATED COUNT: 12.9 % (ref 10–15)
GFR SERPL CREATININE-BSD FRML MDRD: >90 ML/MIN/1.73M2
GLUCOSE BLD-MCNC: 140 MG/DL (ref 70–125)
HCT VFR BLD AUTO: 37.3 % (ref 40–53)
HGB BLD-MCNC: 12.3 G/DL (ref 13.3–17.7)
MCH RBC QN AUTO: 28 PG (ref 26.5–33)
MCHC RBC AUTO-ENTMCNC: 33 G/DL (ref 31.5–36.5)
MCV RBC AUTO: 85 FL (ref 78–100)
PLATELET # BLD AUTO: 190 10E3/UL (ref 150–450)
POTASSIUM BLD-SCNC: 4.7 MMOL/L (ref 3.5–5)
RBC # BLD AUTO: 4.39 10E6/UL (ref 4.4–5.9)
SODIUM SERPL-SCNC: 132 MMOL/L (ref 136–145)
T4 FREE SERPL-MCNC: 1.52 NG/DL (ref 0.9–1.7)
TSH SERPL DL<=0.005 MIU/L-ACNC: 0.27 UIU/ML (ref 0.3–5)
WBC # BLD AUTO: 10.2 10E3/UL (ref 4–11)

## 2023-06-19 PROCEDURE — 250N000011 HC RX IP 250 OP 636

## 2023-06-19 PROCEDURE — 36415 COLL VENOUS BLD VENIPUNCTURE: CPT | Performed by: INTERNAL MEDICINE

## 2023-06-19 PROCEDURE — 120N000001 HC R&B MED SURG/OB

## 2023-06-19 PROCEDURE — 99233 SBSQ HOSP IP/OBS HIGH 50: CPT | Performed by: INTERNAL MEDICINE

## 2023-06-19 PROCEDURE — 80048 BASIC METABOLIC PNL TOTAL CA: CPT | Performed by: INTERNAL MEDICINE

## 2023-06-19 PROCEDURE — 250N000013 HC RX MED GY IP 250 OP 250 PS 637: Performed by: INTERNAL MEDICINE

## 2023-06-19 PROCEDURE — 97530 THERAPEUTIC ACTIVITIES: CPT | Mod: GP

## 2023-06-19 PROCEDURE — 84443 ASSAY THYROID STIM HORMONE: CPT | Performed by: INTERNAL MEDICINE

## 2023-06-19 PROCEDURE — 84439 ASSAY OF FREE THYROXINE: CPT | Performed by: INTERNAL MEDICINE

## 2023-06-19 PROCEDURE — 85027 COMPLETE CBC AUTOMATED: CPT | Performed by: INTERNAL MEDICINE

## 2023-06-19 PROCEDURE — 250N000013 HC RX MED GY IP 250 OP 250 PS 637: Performed by: PHYSICIAN ASSISTANT

## 2023-06-19 PROCEDURE — 97116 GAIT TRAINING THERAPY: CPT | Mod: GP

## 2023-06-19 RX ADMIN — OXYCODONE HYDROCHLORIDE 10 MG: 5 TABLET ORAL at 02:36

## 2023-06-19 RX ADMIN — ATORVASTATIN CALCIUM 20 MG: 10 TABLET, FILM COATED ORAL at 09:24

## 2023-06-19 RX ADMIN — SENNOSIDES AND DOCUSATE SODIUM 1 TABLET: 50; 8.6 TABLET ORAL at 21:31

## 2023-06-19 RX ADMIN — DEXAMETHASONE SODIUM PHOSPHATE 4 MG: 4 INJECTION, SOLUTION INTRAMUSCULAR; INTRAVENOUS at 04:29

## 2023-06-19 RX ADMIN — SENNOSIDES AND DOCUSATE SODIUM 1 TABLET: 50; 8.6 TABLET ORAL at 09:24

## 2023-06-19 RX ADMIN — THERA TABS 1 TABLET: TAB at 09:25

## 2023-06-19 RX ADMIN — OXYCODONE HYDROCHLORIDE 5 MG: 5 TABLET ORAL at 09:25

## 2023-06-19 RX ADMIN — GABAPENTIN 600 MG: 300 CAPSULE ORAL at 09:25

## 2023-06-19 RX ADMIN — GABAPENTIN 600 MG: 300 CAPSULE ORAL at 21:31

## 2023-06-19 RX ADMIN — CHOLECALCIFEROL TAB 10 MCG (400 UNIT) 400 UNITS: 10 TAB at 09:25

## 2023-06-19 RX ADMIN — POLYETHYLENE GLYCOL 3350 17 G: 17 POWDER, FOR SOLUTION ORAL at 09:24

## 2023-06-19 RX ADMIN — OXYCODONE HYDROCHLORIDE 5 MG: 5 TABLET ORAL at 22:50

## 2023-06-19 RX ADMIN — LISINOPRIL 20 MG: 20 TABLET ORAL at 09:25

## 2023-06-19 ASSESSMENT — ACTIVITIES OF DAILY LIVING (ADL)
ADLS_ACUITY_SCORE: 29
ADLS_ACUITY_SCORE: 25
ADLS_ACUITY_SCORE: 29
ADLS_ACUITY_SCORE: 29
ADLS_ACUITY_SCORE: 25
ADLS_ACUITY_SCORE: 25
ADLS_ACUITY_SCORE: 29
ADLS_ACUITY_SCORE: 25

## 2023-06-19 NOTE — PROGRESS NOTES
"Orthopedic Progress Note      Assessment: 4 Days Post-Op  S/P Procedure(s):  BILATERAL LUMBAR 2-3, LUMBAR 3-4 AND LUMBAR 4-5 LAMINECTOMY     Plan:   - Continue PT/OT  - Weightbearing status: WBAT can use brace for comfort  - No bending, twisting or lifting more than 10 pounds for 6 weeks.  - Anticoagulation: no chemical prophylaxis in addition to SCDs, ney stockings and early ambulation.  - Orthosis consult placed for LSO brace  - pain in the right leg is improving no more steroids for now will continue to monitor over the next 24 hours.  - Discharge planning: pending continued pain improvement, medical clearance    Subjective:  Pain: mild  Chest pain, SOB: no  Nausea, Vomiting:  no  Lightheadedness, Dizziness:  no  Neuro:  Patient denies new onset numbness or paresthesias    Patient is doing well today. Notes improvement in his right leg zingers but continues to have some. They are not as intense as they were the past few days. PT is going better today    Objective:  /67 (BP Location: Right arm)   Pulse 81   Temp 97.9  F (36.6  C) (Oral)   Resp 18   Ht 1.778 m (5' 10\")   Wt 132.2 kg (291 lb 8 oz)   SpO2 90%   BMI 41.83 kg/m    The patient is A&Ox3. Appears comfortable.   Sensation is intact.  Dorsiflexion and plantar flexion is intact.  Dorsalis pedis pulse intact.  Calves are soft and non-tender. Negative Andres's.  The incision is covered. Dressing C/D/I.    Pertinent Labs   Lab Results: personally reviewed.   No results found for: INR, PROTIME  Lab Results   Component Value Date    WBC 10.2 06/19/2023    HGB 12.3 (L) 06/19/2023    HCT 37.3 (L) 06/19/2023    MCV 85 06/19/2023     06/19/2023     Lab Results   Component Value Date     (L) 06/19/2023    CO2 23 06/19/2023         Report completed by:  Hali Gill PA-C/Dr. Guerra  [facility]    Date: 6/19/2023  Time: 10:18 AM    "

## 2023-06-19 NOTE — PLAN OF CARE
Patient vital signs are at baseline: Yes  Patient able to ambulate as they were prior to admission or with assist devices provided by therapies during their stay:  Yes  Patient MUST void prior to discharge:  Yes  Patient able to tolerate oral intake:  Yes  Pain has adequate pain control using Oral analgesics:  Yes  Does patient have an identified :  Yes  Has goal D/C date and time been discussed with patient:  Yes    Patient reports better pain control today. Able to ambulate in the dubose with PT. Plan is to discharge to home with family support tomorrow.

## 2023-06-19 NOTE — PLAN OF CARE
Goal Outcome Evaluation:      Plan of Care Reviewed With: patient    Overall Patient Progress: improvingOverall Patient Progress: improving    Outcome Evaluation: Pt is alert and oriented x 4. Afrebile. Pain at a tolerable level at during the shift. When his pain goes up repositioning helps and he accepted oxy 10mg at 0230. IV decadron finished on night shift. Now that pain is more controlled, the plan is to discharge home with wife after therapy.      Problem: Spinal Surgery  Goal: Absence of Infection Signs and Symptoms  Outcome: Progressing     Problem: Spinal Surgery  Goal: Optimal Pain Control and Function  Outcome: Progressing  Intervention: Prevent or Manage Pain  Recent Flowsheet Documentation  Taken 6/19/2023 0236 by Rufus Lainez, RN  Pain Management Interventions:   cold applied   repositioned   medication (see MAR)  Taken 6/19/2023 0035 by Rufus Lainez, RN  Pain Management Interventions: declines

## 2023-06-19 NOTE — PLAN OF CARE
Problem: Spinal Surgery  Goal: Optimal Coping with Surgery  Outcome: Progressing     Problem: Spinal Surgery  Goal: Optimal Pain Control and Function  Outcome: Progressing   Goal Outcome Evaluation:       Patient alert and oriented.VSS on O2 at 2LPM. Pain of 7/10 prn oxycodone given x2. Reports relief. Good urine output. Tolerating Regular diet. Continue to monitor.

## 2023-06-19 NOTE — PROGRESS NOTES
[facility]    Medicine Progress Note - Hospitalist Service    Date of Admission:  6/15/2023    Assessment & Plan   67-year-old male who underwent bilateral L2-L5 laminectomy on 6/15/2023.  Developed SIADH with mild hyponatremia and placed on free water restriction on 6/18/23.  Patient with postoperative fever but has been afebrile for 24+ hours.  Completed perioperative cefazolin on 6/18/23.  CXR, UA negative.  WBC is normal.  Blood cultures obtained on 6/17 NGTD.  ? Drug fever.     Hyponatremia secondary to SIADH  Sodium 130 mmol/L.  Sodium preop was 140 mmol/L  serum osmolality hypotonic hyponatremia  urine osmolality is elevated consistent with SIADH.   TSH with reflex T4 in process.   Continue free water restriction 1500mL.   May discharge with close PCP follow up.   Would continue free water restriction until sodium normalizes.     Postoperative fever  ? Antibiotic drug fever.   tmax 101.5 degrees on morning 6/18/23.  Afebrile since that time.   UA negative.  Procalcitonin normal.  WBC normal.   Chest Xray positive for atelectasis.   Blood cultures in process, NGTD  Encourage IS.     Essential hypertension  Continue lisinopril 20 mg daily    Hyperlipidemia  Atorvastatin 20 mg daily    Postoperative anemia secondary to acute blood loss  Hemoglobin stable at 12.7 g/dL  No indication for transfusion  Patient asymptomatic.    Remote history of Hodgkin's lymphoma    Obstructive sleep apnea  Utilizing supplemental oxygen.  Keep head elevated during naps or bedtime.       Diet: Advance Diet as Tolerated: Regular Diet Adult  Discharge Instruction - Regular Diet Adult    DVT Prophylaxis: Defer to primary service  Marie Catheter: Not present  Lines: None     Cardiac Monitoring: None  Code Status: Full Code      Clinically Significant Risk Factors              # Hypoalbuminemia: Lowest albumin = 3.1 g/dL at 6/18/2023  1:08 AM, will monitor as appropriate     # Hypertension: Noted on problem list   "      # Severe Obesity: Estimated body mass index is 41.83 kg/m  as calculated from the following:    Height as of this encounter: 1.778 m (5' 10\").    Weight as of this encounter: 132.2 kg (291 lb 8 oz)., PRESENT ON ADMISSION          Disposition Plan     Expected Discharge Date: 06/19/2023, 12:00 PM    Destination: home with family  Discharge Comments: pending pain, fevers overnight          Panfilo Fountain DO  Hospitalist Service  [facility]  Securely message with [employer] (more info)  Text page via [facility] Paging/Directory   ______________________________________________________________________    Interval History   Patient feels much better today.  Has less pressure pain in lower extremities. Denies fever or chills.  No nausea or vomiting.  Tolerating diet.     Physical Exam   Vital Signs: Temp: 97.9  F (36.6  C) Temp src: Oral BP: 124/67 Pulse: 81   Resp: 18 SpO2: 90 % O2 Device: None (Room air) Oxygen Delivery: 2 LPM  Weight: 291 lbs 8 oz    Constitutional: alert, no distress and cooperative  Head  normocephalic   Neck:   No JVD  ENT: Pupils symmetrical, sclera anicteric,  Cardiac:  No murmurs, clicks gallops or rub,   Respiratory: Breathing comfortably. Lungs clear  Neurologic:oriented and appropriate, grossly non focal    Medical Decision Making   50 MINUTES SPENT BY ME on the date of service doing chart review, history, exam, documentation & further activities per the note.      Data     I have personally reviewed the following data over the past 24 hrs:    10.2  \   12.3 (L)   / 190     132 (L) 99 29 (H) /  140 (H)   4.7 23 0.89 \       Imaging results reviewed over the past 24 hrs:   Recent Results (from the past 24 hour(s))   XR Chest Port 1 View    Narrative    EXAM: XR CHEST PORT 1 VIEW  LOCATION: [facility]  DATE: 6/18/2023    INDICATION: Fever, postoperative.  COMPARISON: None available      Impression    IMPRESSION: Single AP view of the chest was " obtained. Enlarged cardiac silhouette. Mild basilar pulmonary opacities, could represent atelectasis. No significant pleural effusion or pneumothorax.

## 2023-06-19 NOTE — PROGRESS NOTES
Care Management Follow Up    Length of Stay (days): 4    Expected Discharge Date: 06/20/2023     Concerns to be Addressed: discharge planning       Patient plan of care discussed at interdisciplinary rounds: Yes    Anticipated Discharge Disposition:  home vs TCU     Anticipated Discharge Services: TBD    Anticipated Discharge DME: None    Patient/family educated on Medicare website which has current facility and service quality ratings:  Yes    Education Provided on the Discharge Plan: Yes (AVS per bedside RN)    Patient/Family in Agreement with the Plan: yes    Referrals Placed by CM/SW:  TCU        Additional Information:  CM reviewed chart. CM met with patient and he is agreeable to TCU referrals being sent. Patient agreeable to referrals being sent to both [place] TCU's. Transportation TBD.  CM will follow.     TCU referrals pending  [facility] ([facility])  [facility] ([facility])      Marina Singh RN

## 2023-06-20 ENCOUNTER — LAB REQUISITION (OUTPATIENT)
Dept: LAB | Facility: CLINIC | Age: 68
End: 2023-06-20
Payer: MEDICARE

## 2023-06-20 ENCOUNTER — APPOINTMENT (OUTPATIENT)
Dept: PHYSICAL THERAPY | Facility: CLINIC | Age: 68
DRG: 519 | End: 2023-06-20
Attending: ORTHOPAEDIC SURGERY
Payer: MEDICARE

## 2023-06-20 VITALS
WEIGHT: 291.5 LBS | BODY MASS INDEX: 41.73 KG/M2 | TEMPERATURE: 97.6 F | HEIGHT: 70 IN | HEART RATE: 69 BPM | SYSTOLIC BLOOD PRESSURE: 110 MMHG | OXYGEN SATURATION: 100 % | DIASTOLIC BLOOD PRESSURE: 66 MMHG | RESPIRATION RATE: 16 BRPM

## 2023-06-20 DIAGNOSIS — E87.1 HYPO-OSMOLALITY AND HYPONATREMIA: ICD-10-CM

## 2023-06-20 PROCEDURE — 97110 THERAPEUTIC EXERCISES: CPT | Mod: GP

## 2023-06-20 PROCEDURE — 250N000013 HC RX MED GY IP 250 OP 250 PS 637: Performed by: INTERNAL MEDICINE

## 2023-06-20 PROCEDURE — 250N000013 HC RX MED GY IP 250 OP 250 PS 637: Performed by: PHYSICIAN ASSISTANT

## 2023-06-20 PROCEDURE — 97116 GAIT TRAINING THERAPY: CPT | Mod: GP

## 2023-06-20 RX ORDER — OXYCODONE HYDROCHLORIDE 5 MG/1
5 TABLET ORAL EVERY 4 HOURS PRN
Qty: 32 TABLET | Refills: 0 | Status: SHIPPED | OUTPATIENT
Start: 2023-06-20

## 2023-06-20 RX ORDER — HYDROXYZINE HYDROCHLORIDE 10 MG/1
10 TABLET, FILM COATED ORAL 3 TIMES DAILY PRN
Qty: 30 TABLET | Refills: 0 | Status: SHIPPED | OUTPATIENT
Start: 2023-06-20

## 2023-06-20 RX ADMIN — MAGNESIUM HYDROXIDE 30 ML: 400 SUSPENSION ORAL at 08:30

## 2023-06-20 RX ADMIN — POLYETHYLENE GLYCOL 3350 17 G: 17 POWDER, FOR SOLUTION ORAL at 08:29

## 2023-06-20 RX ADMIN — LISINOPRIL 20 MG: 20 TABLET ORAL at 10:26

## 2023-06-20 RX ADMIN — ACETAMINOPHEN 650 MG: 325 TABLET ORAL at 07:31

## 2023-06-20 RX ADMIN — OXYCODONE HYDROCHLORIDE 10 MG: 5 TABLET ORAL at 07:31

## 2023-06-20 RX ADMIN — SENNOSIDES AND DOCUSATE SODIUM 1 TABLET: 50; 8.6 TABLET ORAL at 08:29

## 2023-06-20 RX ADMIN — GABAPENTIN 600 MG: 300 CAPSULE ORAL at 08:29

## 2023-06-20 RX ADMIN — THERA TABS 1 TABLET: TAB at 08:29

## 2023-06-20 RX ADMIN — ATORVASTATIN CALCIUM 20 MG: 10 TABLET, FILM COATED ORAL at 06:59

## 2023-06-20 RX ADMIN — CHOLECALCIFEROL TAB 10 MCG (400 UNIT) 400 UNITS: 10 TAB at 08:30

## 2023-06-20 ASSESSMENT — ACTIVITIES OF DAILY LIVING (ADL)
ADLS_ACUITY_SCORE: 27
ADLS_ACUITY_SCORE: 25
ADLS_ACUITY_SCORE: 25
ADLS_ACUITY_SCORE: 27
ADLS_ACUITY_SCORE: 25

## 2023-06-20 NOTE — PROGRESS NOTES
Patient has been accepted at [facility] ([facility]) and is agreeable to this placement. Family will provide transportation to [facility] ([facility]).     CM completed PAS - [id number]    CM sent discharge orders. 1:23 PM

## 2023-06-20 NOTE — PLAN OF CARE
Goal Outcome Evaluation:    Patient vital signs are at baseline: Yes  Patient able to ambulate as they were prior to admission or with assist devices provided by therapies during their stay:  Yes  Patient MUST void prior to discharge:  Yes  Patient able to tolerate oral intake:  Yes  Pain has adequate pain control using Oral analgesics:  Yes  Does patient have an identified :  Yes  Has goal D/C date and time been discussed with patient:  Yes    VSS. PRN oxy given x1 for moderate pain, with good results. Reports improvement in pain. Dressing CDI. Voiding adequately. Ambulating Ax1. Free water restriction education provided and maintained.

## 2023-06-20 NOTE — PROGRESS NOTES
Physical Therapy Discharge Summary    Reason for therapy discharge:    Discharged to transitional care facility.    Progress towards therapy goal(s). See goals on Care Plan in [facility] electronic health record for goal details.  Goals not met.  Barriers to achieving goals:   limited tolerance for therapy.    Therapy recommendation(s):    Continued therapy is recommended.  Rationale/Recommendations:  Patient not at functional mobility baseline, would benefit from further rehab.

## 2023-06-20 NOTE — PROGRESS NOTES
"Orthopedic Progress Note      Assessment: 5 Days Post-Op  S/P Procedure(s):  BILATERAL LUMBAR 2-3, LUMBAR 3-4 AND LUMBAR 4-5 LAMINECTOMY     Plan:   - Continue PT/OT  - Weightbearing status: WBAT can use brace for comfort  - No bending, twisting or lifting more than 10 pounds for 6 weeks.  - Anticoagulation: no chemical prophylaxis in addition to SCDs, ney stockings and early ambulation.  - Orthosis consult placed for LSO brace  - pain in the right leg is improving no more steroids for now will continue to monitor over the next 24 hours.  - Discharge planning: TCU placement    Subjective:  Pain: mild  Chest pain, SOB: no  Nausea, Vomiting:  no  Lightheadedness, Dizziness:  no  Neuro:  Patient denies new onset numbness or paresthesias    Patient is doing well today. Notes improvement in his right leg zingers but continues to have some. They are not as intense as they were the past few days. PT is going better today. Planning for TCU    Objective:  BP 94/52 (BP Location: Left arm)   Pulse 81   Temp 97.5  F (36.4  C) (Axillary)   Resp 18   Ht 1.778 m (5' 10\")   Wt 132.2 kg (291 lb 8 oz)   SpO2 92%   BMI 41.83 kg/m    The patient is A&Ox3. Appears comfortable.   Sensation is intact.  Dorsiflexion and plantar flexion is intact.  Dorsalis pedis pulse intact.  Calves are soft and non-tender. Negative Andres's.  The incision is covered. Dressing C/D/I.    Pertinent Labs   Lab Results: personally reviewed.   No results found for: INR, PROTIME  Lab Results   Component Value Date    WBC 10.2 06/19/2023    HGB 12.3 (L) 06/19/2023    HCT 37.3 (L) 06/19/2023    MCV 85 06/19/2023     06/19/2023     Lab Results   Component Value Date     (L) 06/19/2023    CO2 23 06/19/2023         Report completed by:  Hali Gill PA-C/Dr. Guerra  [facility]    06/20/23     "

## 2023-06-20 NOTE — PROGRESS NOTES
Pt discharged to TCU with family transport. Packet of paperwork with family and sent to facility by [facility]. PIV removed. Belongings remain with pt at discharge. VSS, SARABJIT.

## 2023-06-21 ENCOUNTER — LAB REQUISITION (OUTPATIENT)
Dept: LAB | Facility: CLINIC | Age: 68
End: 2023-06-21
Payer: COMMERCIAL

## 2023-06-21 DIAGNOSIS — D64.9 ANEMIA, UNSPECIFIED: ICD-10-CM

## 2023-06-21 NOTE — DISCHARGE SUMMARY
"ORTHOPEDIC DISCHARGE SUMMARY       Desmond Valenzuela,  1955, MRN [id number]    Admission Date: 6/15/2023      Admission Diagnoses: Spinal stenosis, lumbar [M48.061]  Lumbar back pain [M54.50]     Discharge Date: 2023     Post-operative Day:  6 Days Post-Op    Reason for Admission: The patient was admitted for the following: Procedure(s):  BILATERAL LUMBAR 2-3, LUMBAR 3-4 AND LUMBAR 4-5 LAMINECTOMY    BRIEF HOSPITAL COURSE   Desmond Valenzuela is a pleasant 67 year old male who underwent the aforementioned procedure with Dr. Guerra on . There were no intraoperative complications and the patient was transferred to the recovery room and later the orthopedic unit in stable condition. Once the patient reached the orthopedic floor our orthopedic pain protocol was implemented along with the following:    Anticoagulation Medications: None  Therapy: PT and OT  Activity: WBAT  Bracing: None    Consultations during Admission: Hospitalist service for medical management     COMPLICATIONS/SIGNIFICANT FINDINGS    NONE    DISCHARGE INFORMATION   Condition at discharge: Good  Discharge destination: Skilled nursing facility  Patient was seen by myself on the date of discharge.    FOLLOW UP CARE   Follow up with orthopedics in 2 weeks or sooner should the need arise. Ortho will continue to manage pain control, post op anticoagulation and incision care.     Follow up with your PCP for management of chronic medical problems and to evaluate for post op medical complications including constipation, nausea/vomiting, DVT/PE, anemia, changes in blood pressure, fevers/chills, urinary retention and atelectasis/pneumonia.     Subjective   Patient is doing well on POD #1. Pain is well controlled with oral medications. Ambulating. Tolerating oral intake.     Physical Exam   /66 (BP Location: Left arm)   Pulse 69   Temp 97.6  F (36.4  C) (Oral)   Resp 16   Ht 1.778 m (5' 10\")   Wt 132.2 kg (291 lb 8 oz)   SpO2 100%  "  BMI 41.83 kg/m    The patient is A&Ox3. Appears comfortable.   Sensation is intact.  Dorsiflexion and plantar flexion is intact.  Dorsalis pedis pulse intact.  Calves are soft and non-tender. Negative Andres's.  The incision is covered. Dressing C/D/I.    Pertinent Results at Discharge     Hemoglobin   Date/Time Value Ref Range Status   06/19/2023 07:35 AM 12.3 (L) 13.3 - 17.7 g/dL Final   06/18/2023 07:45 AM 12.7 (L) 13.3 - 17.7 g/dL Final   06/18/2023 01:08 AM 12.1 (L) 13.3 - 17.7 g/dL Final     Platelet Count   Date/Time Value Ref Range Status   06/19/2023 07:35  150 - 450 10e3/uL Final   06/18/2023 01:08  150 - 450 10e3/uL Final       Problem List   Principal Problem:    Lumbar back pain  Active Problems:    History of Hodgkin's lymphoma    Benign essential hypertension    Spondylolisthesis of lumbar region    Hyperlipemia    Hx of lumbosacral spine surgery    Morbid obesity (H)    Sleep apnea      Hali Gill PA-C/Dr. Guerra  [facility]  [phone]  Date: 6/21/2023  Time: 2:12 PM

## 2023-06-22 LAB
ANION GAP SERPL CALCULATED.3IONS-SCNC: 11 MMOL/L (ref 7–15)
BUN SERPL-MCNC: 21.6 MG/DL (ref 8–23)
CALCIUM SERPL-MCNC: 9.5 MG/DL (ref 8.8–10.2)
CHLORIDE SERPL-SCNC: 101 MMOL/L (ref 98–107)
CREAT SERPL-MCNC: 0.92 MG/DL (ref 0.67–1.17)
DEPRECATED HCO3 PLAS-SCNC: 24 MMOL/L (ref 22–29)
ERYTHROCYTE [DISTWIDTH] IN BLOOD BY AUTOMATED COUNT: 13.3 % (ref 10–15)
GFR SERPL CREATININE-BSD FRML MDRD: 90 ML/MIN/1.73M2
GLUCOSE SERPL-MCNC: 89 MG/DL (ref 70–99)
HCT VFR BLD AUTO: 38.6 % (ref 40–53)
HGB BLD-MCNC: 12.2 G/DL (ref 13.3–17.7)
MCH RBC QN AUTO: 27.6 PG (ref 26.5–33)
MCHC RBC AUTO-ENTMCNC: 31.6 G/DL (ref 31.5–36.5)
MCV RBC AUTO: 87 FL (ref 78–100)
PLATELET # BLD AUTO: 239 10E3/UL (ref 150–450)
POTASSIUM SERPL-SCNC: 4.6 MMOL/L (ref 3.4–5.3)
RBC # BLD AUTO: 4.42 10E6/UL (ref 4.4–5.9)
SODIUM SERPL-SCNC: 136 MMOL/L (ref 136–145)
WBC # BLD AUTO: 6.2 10E3/UL (ref 4–11)

## 2023-06-22 PROCEDURE — 36415 COLL VENOUS BLD VENIPUNCTURE: CPT | Performed by: INTERNAL MEDICINE

## 2023-06-22 PROCEDURE — P9603 ONE-WAY ALLOW PRORATED MILES: HCPCS | Performed by: INTERNAL MEDICINE

## 2023-06-22 PROCEDURE — 85027 COMPLETE CBC AUTOMATED: CPT | Performed by: INTERNAL MEDICINE

## 2023-06-22 PROCEDURE — 80048 BASIC METABOLIC PNL TOTAL CA: CPT | Performed by: INTERNAL MEDICINE

## 2023-06-23 LAB
BACTERIA BLD CULT: NO GROWTH
BACTERIA BLD CULT: NO GROWTH

## 2023-06-26 ENCOUNTER — LAB REQUISITION (OUTPATIENT)
Dept: LAB | Facility: CLINIC | Age: 68
End: 2023-06-26
Payer: COMMERCIAL

## 2023-06-26 DIAGNOSIS — E22.2 SYNDROME OF INAPPROPRIATE SECRETION OF ANTIDIURETIC HORMONE (H): ICD-10-CM

## 2023-06-27 LAB
ANION GAP SERPL CALCULATED.3IONS-SCNC: 10 MMOL/L (ref 7–15)
BUN SERPL-MCNC: 14.8 MG/DL (ref 8–23)
CALCIUM SERPL-MCNC: 9.5 MG/DL (ref 8.8–10.2)
CHLORIDE SERPL-SCNC: 102 MMOL/L (ref 98–107)
CREAT SERPL-MCNC: 0.9 MG/DL (ref 0.67–1.17)
DEPRECATED HCO3 PLAS-SCNC: 22 MMOL/L (ref 22–29)
GFR SERPL CREATININE-BSD FRML MDRD: >90 ML/MIN/1.73M2
GLUCOSE SERPL-MCNC: 96 MG/DL (ref 70–99)
POTASSIUM SERPL-SCNC: 4.7 MMOL/L (ref 3.4–5.3)
SODIUM SERPL-SCNC: 134 MMOL/L (ref 136–145)

## 2023-06-27 PROCEDURE — 80048 BASIC METABOLIC PNL TOTAL CA: CPT | Performed by: INTERNAL MEDICINE

## 2023-06-27 PROCEDURE — 36415 COLL VENOUS BLD VENIPUNCTURE: CPT | Performed by: INTERNAL MEDICINE

## 2023-06-27 PROCEDURE — P9603 ONE-WAY ALLOW PRORATED MILES: HCPCS | Performed by: INTERNAL MEDICINE

## (undated) DEVICE — DRAPE STERI TOWEL LG 1010

## (undated) DEVICE — SOL NACL 0.9% IRRIG 1000ML BOTTLE 2F7124

## (undated) DEVICE — KIT DRAIN CLOSED WOUND SUCTION MED 400ML RESVR

## (undated) DEVICE — SOL WATER IRRIG 1000ML BOTTLE 2F7114

## (undated) DEVICE — RX SURGIFLO HEMOSTATIC MATRIX 8ML 2991

## (undated) DEVICE — PLATE GROUNDING ADULT W/CORD 9165L

## (undated) DEVICE — CUSHION INSERT LG PRONE VIEW JACKSON TABLE

## (undated) DEVICE — GOWN XLG DISP 9545

## (undated) DEVICE — TOOL DISSECT MIDAS MR8 14CM MATCH HEAD 3MM MR8-14MH30

## (undated) DEVICE — DRSG TEGADERM 2 3/8X2 3/4" 1624W

## (undated) DEVICE — SUTURE VICRYL+ 2-0 27IN CT-1 UND VCP259H

## (undated) DEVICE — SYR 05ML LL W/O NDL

## (undated) DEVICE — CUSTOM PACK LUMBAR FUSION SNE5BLFHEA

## (undated) DEVICE — GLOVE BIOGEL PI INDICATOR 8.0 LF 41680

## (undated) DEVICE — PACK COLD 6 X 10 1-HOUR 0814-0610

## (undated) DEVICE — GOWN IMPERVIOUS BREATHABLE SMART XLG 89045

## (undated) DEVICE — Device

## (undated) DEVICE — DRSG PRIMAPORE 03 1/8X6" 66000318

## (undated) DEVICE — ESU PENCIL SMOKE EVAC W/ROCKER SWITCH 0703-047-000

## (undated) DEVICE — SUCTION MANIFOLD NEPTUNE 2 SYS 1 PORT 702-025-000

## (undated) DEVICE — SPONGE RAY-TEC 4X4" 7317

## (undated) DEVICE — GLOVE BIOGEL PI SZ 7.0 40870

## (undated) DEVICE — CATH TRAY FOLEY SURESTEP 16FR DRAIN BAG STATOCK A899916

## (undated) DEVICE — MARKER SURG SKIN STRL 77734

## (undated) DEVICE — DRAPE C-ARM 60X42" 1013

## (undated) DEVICE — GLOVE UNDER INDICATOR PI SZ 7.0 LF 41670

## (undated) DEVICE — SUTURE VICRYL+ 2-0 CT-2 27" UND VCP269H

## (undated) DEVICE — ESU ELEC BLADE 2.75" COATED/INSULATED E1455

## (undated) DEVICE — SU DERMABOND ADVANCED .7ML DNX12

## (undated) DEVICE — SU VICRYL+ 0 27IN CT-2 UND VCP270H

## (undated) DEVICE — DRAPE STERI U 1015

## (undated) DEVICE — WRAP LUMBAR COMPRESS COLD THERAPY 4632P

## (undated) DEVICE — CELL SAVER

## (undated) DEVICE — DRSG BIOPATCH GERMICIDAL SPLIT SPONGE 4MM MED 4150

## (undated) DEVICE — GLOVE BIOGEL PI SZ 8.0 40880

## (undated) DEVICE — CATH IV ANGIO INTRO 14GA X 1 3/4" 381467

## (undated) DEVICE — POSITIONER ARM CRADLE LAMINECTOMY DISP

## (undated) DEVICE — DRAPE TOWEL IMPERVIOUS X2 7553

## (undated) DEVICE — SU MONOCRYL 3-0 PS-2 18" UND MCP497G

## (undated) RX ORDER — FENTANYL CITRATE-0.9 % NACL/PF 10 MCG/ML
PLASTIC BAG, INJECTION (ML) INTRAVENOUS
Status: DISPENSED
Start: 2023-06-15

## (undated) RX ORDER — LABETALOL HYDROCHLORIDE 5 MG/ML
INJECTION, SOLUTION INTRAVENOUS
Status: DISPENSED
Start: 2023-06-15

## (undated) RX ORDER — LIDOCAINE HYDROCHLORIDE 10 MG/ML
INJECTION, SOLUTION EPIDURAL; INFILTRATION; INTRACAUDAL; PERINEURAL
Status: DISPENSED
Start: 2023-06-15

## (undated) RX ORDER — PROPOFOL 10 MG/ML
INJECTION, EMULSION INTRAVENOUS
Status: DISPENSED
Start: 2023-06-15

## (undated) RX ORDER — DEXAMETHASONE SODIUM PHOSPHATE 10 MG/ML
INJECTION, EMULSION INTRAMUSCULAR; INTRAVENOUS
Status: DISPENSED
Start: 2023-06-15

## (undated) RX ORDER — ONDANSETRON 2 MG/ML
INJECTION INTRAMUSCULAR; INTRAVENOUS
Status: DISPENSED
Start: 2023-06-15

## (undated) RX ORDER — FENTANYL CITRATE 50 UG/ML
INJECTION, SOLUTION INTRAMUSCULAR; INTRAVENOUS
Status: DISPENSED
Start: 2023-06-15